# Patient Record
Sex: FEMALE | Race: WHITE | HISPANIC OR LATINO | URBAN - METROPOLITAN AREA
[De-identification: names, ages, dates, MRNs, and addresses within clinical notes are randomized per-mention and may not be internally consistent; named-entity substitution may affect disease eponyms.]

---

## 2021-01-16 ENCOUNTER — OFFICE VISIT (OUTPATIENT)
Dept: URBAN - METROPOLITAN AREA TELEHEALTH 2 | Facility: TELEHEALTH | Age: 76
End: 2021-01-16
Payer: MEDICARE

## 2021-01-16 DIAGNOSIS — D50.9 ANEMIA, IRON DEFICIENCY: ICD-10-CM

## 2021-01-16 DIAGNOSIS — K59.09 CHRONIC CONSTIPATION: ICD-10-CM

## 2021-01-16 DIAGNOSIS — D50.8 ANEMIA, DUE TO INADEQUATE IRON INTAKE: ICD-10-CM

## 2021-01-16 PROBLEM — 87522002 IRON DEFICIENCY ANEMIA: Status: ACTIVE | Noted: 2021-01-16

## 2021-01-16 PROCEDURE — G9904 DOC MED RSN NO TBCO SCRN: HCPCS | Performed by: INTERNAL MEDICINE

## 2021-01-16 PROCEDURE — 99203 OFFICE O/P NEW LOW 30 MIN: CPT | Performed by: INTERNAL MEDICINE

## 2021-01-16 PROCEDURE — G8427 DOCREV CUR MEDS BY ELIG CLIN: HCPCS | Performed by: INTERNAL MEDICINE

## 2021-01-16 PROCEDURE — G8482 FLU IMMUNIZE ORDER/ADMIN: HCPCS | Performed by: INTERNAL MEDICINE

## 2021-01-16 PROCEDURE — G8422 PT INELIG BMI CALCULATION: HCPCS | Performed by: INTERNAL MEDICINE

## 2021-01-16 PROCEDURE — 1036F TOBACCO NON-USER: CPT | Performed by: INTERNAL MEDICINE

## 2021-01-16 PROCEDURE — G9903 PT SCRN TBCO ID AS NON USER: HCPCS | Performed by: INTERNAL MEDICINE

## 2021-01-16 RX ORDER — FOLIC ACID 1 MG/1
TABLET ORAL
Qty: 0 | Refills: 0 | Status: ACTIVE | COMMUNITY
Start: 2016-09-19

## 2021-01-16 RX ORDER — SODIUM, POTASSIUM,MAG SULFATES 17.5-3.13G
354ML SOLUTION, RECONSTITUTED, ORAL ORAL
Qty: 345 MILLILITER | Refills: 0 | OUTPATIENT
Start: 2021-01-16 | End: 2021-01-17

## 2021-01-16 RX ORDER — ATORVASTATIN CALCIUM 10 MG/1
TABLET, FILM COATED ORAL
Qty: 0 | Refills: 0 | Status: ACTIVE | COMMUNITY
Start: 2016-06-16

## 2021-01-16 NOTE — HPI-TODAY'S VISIT:
74 yo pt referred by Dr. Simone Dowd and Dr. Michael Busby for evaluation of anemia. Patient was Dx'd w/ COVID-19 pneumonia, admitted to [facility] x 4 days, resolved, w/ normal Pulmonary evaluation post-Covid, currently w/o respiratory sxs. patient w/ Hx RA, on Xeljanz / MTX qwk, off these Rx sinve she was Dx'd w/ AYAAN, and no melenic stools, but she reports intermittent rectal bleeding w/ straining at stools. No hematochezia. Denies use of ASA / NSAIDs and no anorexia or weight loss.  She has chronic constipation, controlled w/ Prunelax.  Normal colonoscopy 9 years ago with Dr. Busby. No other complaints.

## 2021-02-05 ENCOUNTER — OFFICE VISIT (OUTPATIENT)
Dept: URBAN - METROPOLITAN AREA LAB 3 | Facility: LAB | Age: 76
End: 2021-02-05
Payer: MEDICARE

## 2021-02-05 DIAGNOSIS — D50.9 ANEMIA, IRON DEFICIENCY: ICD-10-CM

## 2021-02-05 DIAGNOSIS — K25.9 ANTRAL ULCER: ICD-10-CM

## 2021-02-05 DIAGNOSIS — K31.89 ACQUIRED DEFORMITY OF DUODENUM: ICD-10-CM

## 2021-02-05 PROCEDURE — 45378 DIAGNOSTIC COLONOSCOPY: CPT | Performed by: INTERNAL MEDICINE

## 2021-02-05 PROCEDURE — 43239 EGD BIOPSY SINGLE/MULTIPLE: CPT | Performed by: INTERNAL MEDICINE

## 2021-02-05 RX ORDER — ATORVASTATIN CALCIUM 10 MG/1
TABLET, FILM COATED ORAL
Qty: 0 | Refills: 0 | Status: ACTIVE | COMMUNITY
Start: 2016-06-16

## 2021-02-05 RX ORDER — FOLIC ACID 1 MG/1
TABLET ORAL
Qty: 0 | Refills: 0 | Status: ACTIVE | COMMUNITY
Start: 2016-09-19

## 2021-02-08 ENCOUNTER — TELEPHONE ENCOUNTER (OUTPATIENT)
Dept: URBAN - METROPOLITAN AREA CLINIC 98 | Facility: CLINIC | Age: 76
End: 2021-02-08

## 2021-02-08 RX ORDER — PANTOPRAZOLE SODIUM 40 MG/1
1 TABLET TABLET, DELAYED RELEASE ORAL ONCE A DAY
Qty: 30 | Refills: 3 | OUTPATIENT
Start: 2021-02-13

## 2021-02-16 ENCOUNTER — OFFICE VISIT (OUTPATIENT)
Dept: URBAN - METROPOLITAN AREA CLINIC 98 | Facility: CLINIC | Age: 76
End: 2021-02-16

## 2021-02-25 ENCOUNTER — OFFICE VISIT (OUTPATIENT)
Dept: URBAN - METROPOLITAN AREA CLINIC 98 | Facility: CLINIC | Age: 76
End: 2021-02-25

## 2021-03-06 ENCOUNTER — OFFICE VISIT (OUTPATIENT)
Dept: URBAN - METROPOLITAN AREA TELEHEALTH 2 | Facility: TELEHEALTH | Age: 76
End: 2021-03-06
Payer: MEDICARE

## 2021-03-06 DIAGNOSIS — K57.90 DIVERTICULOSIS: ICD-10-CM

## 2021-03-06 DIAGNOSIS — K21.9 GERD WITHOUT ESOPHAGITIS: ICD-10-CM

## 2021-03-06 DIAGNOSIS — E61.1 IRON DEFICIENCY: ICD-10-CM

## 2021-03-06 PROCEDURE — 99214 OFFICE O/P EST MOD 30 MIN: CPT | Performed by: INTERNAL MEDICINE

## 2021-03-06 RX ORDER — PANTOPRAZOLE SODIUM 40 MG/1
1 TABLET TABLET, DELAYED RELEASE ORAL ONCE A DAY
Qty: 30 | Refills: 3 | Status: ACTIVE | COMMUNITY
Start: 2021-02-13

## 2021-03-06 RX ORDER — FOLIC ACID 1 MG/1
TABLET ORAL
Qty: 0 | Refills: 0 | Status: ACTIVE | COMMUNITY
Start: 2016-09-19

## 2021-03-06 RX ORDER — PANTOPRAZOLE SODIUM 40 MG/1
1 TABLET TABLET, DELAYED RELEASE ORAL ONCE A DAY
Qty: 30 | Refills: 3 | OUTPATIENT
Start: 2021-03-06

## 2021-03-06 RX ORDER — ATORVASTATIN CALCIUM 10 MG/1
TABLET, FILM COATED ORAL
Qty: 0 | Refills: 0 | Status: ACTIVE | COMMUNITY
Start: 2016-06-16

## 2021-03-06 NOTE — HPI-TODAY'S VISIT:
76 yo pt w/ AYAAN,RA was on Xeljanz / MTX q wk, none since she was Dx'd w/ COVID-19 pneumonia requiring Hospital admission x 4 days w/ resolution and normal CXR last week w/ normal Pulmonary evaluation. Overall, feeling better. EGD 2/21: NERD, 1/2 size HH and ulcerative Hp-negative reactive gastritis w/ normal duodenal bx's. Colonoscopy 2/21: L-diverticulosis and E Hrrds.  Currently on FesO4. Labs w/ Dr. Dowd 2 wks ago and results are pending. She has been scheduled for Injectafer in a couple of weeks Has no evidence of GI or  bleeding. No anorexia or weight loss. Her ARSLAN sxs are controlled w/ diet and Pantoprazole qd.

## 2021-03-07 PROBLEM — 143441000119108: Status: ACTIVE | Noted: 2021-03-07

## 2021-04-13 ENCOUNTER — OFFICE VISIT (OUTPATIENT)
Dept: URBAN - METROPOLITAN AREA CLINIC 98 | Facility: CLINIC | Age: 76
End: 2021-04-13
Payer: MEDICARE

## 2021-04-13 DIAGNOSIS — K21.9 GERD WITHOUT ESOPHAGITIS: ICD-10-CM

## 2021-04-13 DIAGNOSIS — K57.90 DIVERTICULOSIS: ICD-10-CM

## 2021-04-13 DIAGNOSIS — D50.0 IRON DEFICIENCY ANEMIA DUE TO CHRONIC BLOOD LOSS: ICD-10-CM

## 2021-04-13 DIAGNOSIS — M05.79 RHEUMATOID ARTHRITIS INVOLVING MULTIPLE SITES WITH POSITIVE RHEUMATOID FACTOR: ICD-10-CM

## 2021-04-13 PROCEDURE — 99214 OFFICE O/P EST MOD 30 MIN: CPT | Performed by: INTERNAL MEDICINE

## 2021-04-13 RX ORDER — PANTOPRAZOLE SODIUM 40 MG/1
1 TABLET TABLET, DELAYED RELEASE ORAL ONCE A DAY
Qty: 30 | Refills: 3 | OUTPATIENT
Start: 2021-04-15

## 2021-04-13 RX ORDER — FAMOTIDINE 40 MG/1
1 TABLET AT BEDTIME TABLET, FILM COATED ORAL ONCE A DAY
Qty: 30 | Refills: 3 | OUTPATIENT
Start: 2021-04-15

## 2021-04-13 RX ORDER — PANTOPRAZOLE SODIUM 40 MG/1
1 TABLET TABLET, DELAYED RELEASE ORAL ONCE A DAY
Qty: 30 | Refills: 3 | Status: DISCONTINUED | COMMUNITY
Start: 2021-03-06

## 2021-04-13 RX ORDER — PANTOPRAZOLE SODIUM 40 MG/1
1 TABLET TABLET, DELAYED RELEASE ORAL ONCE A DAY
Qty: 30 | Refills: 3 | Status: ACTIVE | COMMUNITY
Start: 2021-02-13

## 2021-04-13 RX ORDER — ATORVASTATIN CALCIUM 10 MG/1
TABLET, FILM COATED ORAL
Qty: 0 | Refills: 0 | Status: ACTIVE | COMMUNITY
Start: 2016-06-16

## 2021-04-13 RX ORDER — FOLIC ACID 1 MG/1
TABLET ORAL
Qty: 0 | Refills: 0 | Status: ACTIVE | COMMUNITY
Start: 2016-09-19

## 2021-04-13 NOTE — HPI-TODAY'S VISIT:
76 yo pt w/ AYAAN, RA currently on Prednisone 7.5 mg /d, who has been feeling better lately. Some intermittent p-prandial abdominal pain, w/o radiation which is gradually resolving. Her ARSLAN sxs are well controlled, and has just started back on Pantoprazole.  Had CPE w/ Dr. Busby 2 wks ago: increase in Hb on FeSO4 and no need for Injectafer at ths time. EGD 2/21: NERD, 1/2 size HH and ulceratibe Hp-negative gastritis. Colonoscopy 2/21: L-diverticulosis and E-Hrrds. She has no bleeding nor melenic stools and reports no cardiorespiratory sxs.

## 2021-07-13 ENCOUNTER — OFFICE VISIT (OUTPATIENT)
Dept: URBAN - METROPOLITAN AREA CLINIC 98 | Facility: CLINIC | Age: 76
End: 2021-07-13

## 2021-08-05 ENCOUNTER — WEB ENCOUNTER (OUTPATIENT)
Dept: URBAN - METROPOLITAN AREA CLINIC 98 | Facility: CLINIC | Age: 76
End: 2021-08-05

## 2021-08-05 ENCOUNTER — OFFICE VISIT (OUTPATIENT)
Dept: URBAN - METROPOLITAN AREA CLINIC 98 | Facility: CLINIC | Age: 76
End: 2021-08-05
Payer: MEDICARE

## 2021-08-05 DIAGNOSIS — K59.01 SLOW TRANSIT CONSTIPATION: ICD-10-CM

## 2021-08-05 DIAGNOSIS — K31.84 GASTROPARESIS: ICD-10-CM

## 2021-08-05 DIAGNOSIS — K21.9 GERD WITHOUT ESOPHAGITIS: ICD-10-CM

## 2021-08-05 DIAGNOSIS — K57.90 DIVERTICULOSIS: ICD-10-CM

## 2021-08-05 DIAGNOSIS — D50.0 IRON DEFICIENCY ANEMIA DUE TO CHRONIC BLOOD LOSS: ICD-10-CM

## 2021-08-05 DIAGNOSIS — M05.79 RHEUMATOID ARTHRITIS INVOLVING MULTIPLE SITES WITH POSITIVE RHEUMATOID FACTOR: ICD-10-CM

## 2021-08-05 PROCEDURE — 99213 OFFICE O/P EST LOW 20 MIN: CPT | Performed by: INTERNAL MEDICINE

## 2021-08-05 RX ORDER — PANTOPRAZOLE SODIUM 40 MG/1
1 TABLET TABLET, DELAYED RELEASE ORAL ONCE A DAY
Qty: 30 | Refills: 3 | Status: DISCONTINUED | COMMUNITY
Start: 2021-04-15

## 2021-08-05 RX ORDER — ATORVASTATIN CALCIUM 10 MG/1
TABLET, FILM COATED ORAL
Qty: 0 | Refills: 0 | Status: ACTIVE | COMMUNITY
Start: 2016-06-16

## 2021-08-05 RX ORDER — FAMOTIDINE 40 MG/1
1 TABLET AT BEDTIME TABLET, FILM COATED ORAL ONCE A DAY
Qty: 30 | Refills: 3 | Status: ACTIVE | COMMUNITY
Start: 2021-04-15

## 2021-08-05 RX ORDER — PANTOPRAZOLE SODIUM 40 MG/1
1 TABLET TABLET, DELAYED RELEASE ORAL ONCE A DAY
Qty: 30 | Refills: 3 | Status: ACTIVE | COMMUNITY
Start: 2021-02-13

## 2021-08-05 RX ORDER — FOLIC ACID 1 MG/1
TABLET ORAL
Qty: 0 | Refills: 0 | Status: ACTIVE | COMMUNITY
Start: 2016-09-19

## 2021-08-05 RX ORDER — TOFACITINIB 11 MG/1
1 TABLET TABLET, FILM COATED, EXTENDED RELEASE ORAL ONCE A DAY
Status: ACTIVE | COMMUNITY

## 2021-08-05 NOTE — HPI-TODAY'S VISIT:
74 yo pt w/ AYAAN, RA currently on Prednisone 2.5 mg /qod, who has been feeling better lately. Some intermittent p-prandial abdominal pain, distention, w/o radiation which is gradually resolving. Her ARSLAN sxs are well controlled, and has just started back on Pantoprazole.  Had recent CPE w/ Dr. Busby: increase in Hb on FeSO4 and no need for Injectafer at ths time. EGD 2/21: NERD, 1/2 size HH and ulcerative Hp-negative gastritis. Colonoscopy 2/21: L-diverticulosis and E-Hrrds. She has no bleeding nor melenic stools and reports no cardiorespiratory sxs.

## 2021-08-10 ENCOUNTER — TELEPHONE ENCOUNTER (OUTPATIENT)
Dept: URBAN - METROPOLITAN AREA CLINIC 23 | Facility: CLINIC | Age: 76
End: 2021-08-10

## 2021-10-12 ENCOUNTER — OFFICE VISIT (OUTPATIENT)
Dept: URBAN - METROPOLITAN AREA CLINIC 98 | Facility: CLINIC | Age: 76
End: 2021-10-12

## 2021-11-18 ENCOUNTER — OFFICE VISIT (OUTPATIENT)
Dept: URBAN - METROPOLITAN AREA CLINIC 98 | Facility: CLINIC | Age: 76
End: 2021-11-18
Payer: MEDICARE

## 2021-11-18 DIAGNOSIS — K21.9 GERD WITHOUT ESOPHAGITIS: ICD-10-CM

## 2021-11-18 DIAGNOSIS — K57.90 DIVERTICULOSIS: ICD-10-CM

## 2021-11-18 DIAGNOSIS — M05.79 RHEUMATOID ARTHRITIS INVOLVING MULTIPLE SITES WITH POSITIVE RHEUMATOID FACTOR: ICD-10-CM

## 2021-11-18 DIAGNOSIS — K31.84 GASTROPARESIS: ICD-10-CM

## 2021-11-18 DIAGNOSIS — K59.01 SLOW TRANSIT CONSTIPATION: ICD-10-CM

## 2021-11-18 DIAGNOSIS — D50.0 IRON DEFICIENCY ANEMIA DUE TO CHRONIC BLOOD LOSS: ICD-10-CM

## 2021-11-18 PROCEDURE — 99214 OFFICE O/P EST MOD 30 MIN: CPT | Performed by: INTERNAL MEDICINE

## 2021-11-18 RX ORDER — FOLIC ACID 1 MG/1
TABLET ORAL
Qty: 0 | Refills: 0 | Status: ACTIVE | COMMUNITY
Start: 2016-09-19

## 2021-11-18 RX ORDER — TOFACITINIB 11 MG/1
1 TABLET TABLET, FILM COATED, EXTENDED RELEASE ORAL ONCE A DAY
Status: ACTIVE | COMMUNITY

## 2021-11-18 RX ORDER — PANTOPRAZOLE SODIUM 40 MG/1
1 TABLET TABLET, DELAYED RELEASE ORAL ONCE A DAY
Qty: 30 | Refills: 3 | Status: ACTIVE | COMMUNITY
Start: 2021-02-13

## 2021-11-18 RX ORDER — ATORVASTATIN CALCIUM 10 MG/1
TABLET, FILM COATED ORAL
Qty: 0 | Refills: 0 | Status: ACTIVE | COMMUNITY
Start: 2016-06-16

## 2021-11-18 RX ORDER — FAMOTIDINE 40 MG/1
1 TABLET AT BEDTIME TABLET, FILM COATED ORAL ONCE A DAY
Qty: 30 | Refills: 3 | Status: ACTIVE | COMMUNITY
Start: 2021-04-15

## 2021-11-18 NOTE — HPI-TODAY'S VISIT:
76 yo pt w/ AYAAN, seropositive RA currently on Prednisone 2.5 mg /qod ( failed Humira, was on Xeljanz / MTX  )who has been feeling better lately, w less epigastric discomfort and no N/V nor lalito ARSLAN sxs. Some intermittent p-prandial epigastric discomfort w/ distention, w/o radiation which is gradually improving w/ diet changes.  Her ARSLAN sxs are well controlled on Pantoprazole.  Had recent CPE w/ Dr. Busby: increase in Hb on FeSO4 and no need for Injectafer at ths time. EGD 2/21: NERD, 1/2 size HH and ulcerative Hp-negative gastritis. Colonoscopy 2/21: L-diverticulosis and E-Hrrds. She has no bleeding nor melenic stools and reports no cardiorespiratory sxs. GES not done as yet. Had COVID-19 12/20 and has received 2 doses of COVID-19 vaccine.

## 2022-01-14 ENCOUNTER — ERX REFILL RESPONSE (OUTPATIENT)
Dept: URBAN - METROPOLITAN AREA CLINIC 98 | Facility: CLINIC | Age: 77
End: 2022-01-14

## 2022-01-14 RX ORDER — FAMOTIDINE 40 MG/1
TAKE 1 TABLET ONE TIME DAILY AT BEDTIME TABLET, FILM COATED ORAL
Qty: 90 TABLET | Refills: 0 | OUTPATIENT

## 2022-01-14 RX ORDER — FAMOTIDINE 40 MG/1
TAKE 1 TABLET ONE TIME DAILY AT BEDTIME TABLET, FILM COATED ORAL
Qty: 90 TABLET | Refills: 1 | OUTPATIENT

## 2022-01-14 RX ORDER — PANTOPRAZOLE SODIUM 40 MG/1
TAKE 1 TABLET ONE TIME DAILY TABLET, DELAYED RELEASE ORAL
Qty: 90 TABLET | Refills: 0 | OUTPATIENT

## 2022-01-14 RX ORDER — PANTOPRAZOLE SODIUM 40 MG/1
TAKE 1 TABLET ONE TIME DAILY TABLET, DELAYED RELEASE ORAL
Qty: 90 TABLET | Refills: 1 | OUTPATIENT

## 2022-03-30 ENCOUNTER — ERX REFILL RESPONSE (OUTPATIENT)
Dept: URBAN - METROPOLITAN AREA CLINIC 98 | Facility: CLINIC | Age: 77
End: 2022-03-30

## 2022-03-30 RX ORDER — FAMOTIDINE 40 MG/1
TAKE 1 TABLET ONE TIME DAILY AT BEDTIME TABLET, FILM COATED ORAL
Qty: 90 TABLET | Refills: 1 | OUTPATIENT

## 2022-03-30 RX ORDER — PANTOPRAZOLE SODIUM 40 MG/1
TAKE 1 TABLET ONE TIME DAILY TABLET, DELAYED RELEASE ORAL
Qty: 90 TABLET | Refills: 1 | OUTPATIENT

## 2022-06-08 ENCOUNTER — OFFICE VISIT (OUTPATIENT)
Dept: URBAN - METROPOLITAN AREA CLINIC 98 | Facility: CLINIC | Age: 77
End: 2022-06-08
Payer: MEDICARE

## 2022-06-08 VITALS
DIASTOLIC BLOOD PRESSURE: 64 MMHG | TEMPERATURE: 97.1 F | HEIGHT: 63 IN | BODY MASS INDEX: 25.94 KG/M2 | SYSTOLIC BLOOD PRESSURE: 116 MMHG | WEIGHT: 146.4 LBS | HEART RATE: 68 BPM

## 2022-06-08 DIAGNOSIS — K57.90 DIVERTICULOSIS: ICD-10-CM

## 2022-06-08 DIAGNOSIS — D50.0 IRON DEFICIENCY ANEMIA DUE TO CHRONIC BLOOD LOSS: ICD-10-CM

## 2022-06-08 DIAGNOSIS — K31.84 GASTROPARESIS: ICD-10-CM

## 2022-06-08 DIAGNOSIS — K21.9 GERD WITHOUT ESOPHAGITIS: ICD-10-CM

## 2022-06-08 DIAGNOSIS — M05.79 RHEUMATOID ARTHRITIS INVOLVING MULTIPLE SITES WITH POSITIVE RHEUMATOID FACTOR: ICD-10-CM

## 2022-06-08 DIAGNOSIS — K59.01 SLOW TRANSIT CONSTIPATION: ICD-10-CM

## 2022-06-08 PROCEDURE — 99214 OFFICE O/P EST MOD 30 MIN: CPT | Performed by: INTERNAL MEDICINE

## 2022-06-08 RX ORDER — PANTOPRAZOLE SODIUM 40 MG/1
TAKE 1 TABLET ONE TIME DAILY TABLET, DELAYED RELEASE ORAL
Qty: 90 TABLET | Refills: 1 | Status: ACTIVE | COMMUNITY

## 2022-06-08 RX ORDER — PRUCALOPRIDE 1 MG/1
1 TABLET TABLET, FILM COATED ORAL ONCE A DAY
Qty: 30 | Refills: 3 | OUTPATIENT
Start: 2022-06-08 | End: 2022-10-06

## 2022-06-08 RX ORDER — APIXABAN 2.5 MG/1
AS DIRECTED TABLET, FILM COATED ORAL
Status: ACTIVE | COMMUNITY

## 2022-06-08 RX ORDER — PRUCALOPRIDE 1 MG/1
1 TABLET TABLET, FILM COATED ORAL ONCE A DAY
Qty: 30 | Refills: 3 | OUTPATIENT
Start: 2022-06-13 | End: 2022-10-11

## 2022-06-08 RX ORDER — ATORVASTATIN CALCIUM 10 MG/1
TABLET, FILM COATED ORAL
Qty: 0 | Refills: 0 | Status: ACTIVE | COMMUNITY
Start: 2016-06-16

## 2022-06-08 RX ORDER — FAMOTIDINE 40 MG/1
TAKE 1 TABLET ONE TIME DAILY AT BEDTIME TABLET, FILM COATED ORAL
Qty: 90 TABLET | Refills: 1 | Status: ACTIVE | COMMUNITY

## 2022-06-08 RX ORDER — FOLIC ACID 1 MG/1
TABLET ORAL
Qty: 0 | Refills: 0 | Status: ACTIVE | COMMUNITY
Start: 2016-09-19

## 2022-06-08 RX ORDER — TOFACITINIB 11 MG/1
1 TABLET TABLET, FILM COATED, EXTENDED RELEASE ORAL ONCE A DAY
Status: ACTIVE | COMMUNITY

## 2022-06-08 NOTE — HPI-TODAY'S VISIT:
75 yo pt w/ AYAAN, seropositive RA currently on Prednisone 2.5 mg /qod ( failed Humira, was on Xeljanz / MTX  ), she  will be starting Rinvoq ( Upadacitinib ) for RA., who has been feeling better lately, w less epigastric discomfort and no N/V nor lalito ARSLAN sxs. Some intermittent p-prandial epigastric discomfort w/ distention, w/o radiation which is gradually improving w/ diet changes.  Her ARSLAN sxs are well controlled on Pantoprazole.  Had recent CPE w/ Dr. Busby: increase in Hb on FeSO4 and no need for Injectafer at ths time. EGD 2/21: NERD, 1/2 size HH and ulcerative Hp-negative gastritis. Colonoscopy 2/21: L-diverticulosis and E-Hrrds.  GES 12/21: severe gastroparesis. Was experiencing palpitations / SINGLETON; seen by Cardiology, Dr. Elder Barrera: Dxs w A. fib, on Sotalol / Eliquis, and has appointmrent w Dr Izabela Singh  for Echo tomorrow.  Seen  by Dr. Busby recently: Labs and A + P CT scan pending. Recent s/p variociose veins treatment.  She has no bleeding nor melenic stools and reports no cardiorespiratory sxs. Had COVID-19 12/20 and has received 2 doses of COVID-19 vaccine.

## 2022-08-10 ENCOUNTER — OFFICE VISIT (OUTPATIENT)
Dept: URBAN - METROPOLITAN AREA CLINIC 98 | Facility: CLINIC | Age: 77
End: 2022-08-10
Payer: MEDICARE

## 2022-08-10 VITALS
DIASTOLIC BLOOD PRESSURE: 71 MMHG | WEIGHT: 144.2 LBS | HEART RATE: 70 BPM | SYSTOLIC BLOOD PRESSURE: 122 MMHG | BODY MASS INDEX: 25.55 KG/M2 | TEMPERATURE: 97.2 F | HEIGHT: 63 IN

## 2022-08-10 DIAGNOSIS — M05.79 RHEUMATOID ARTHRITIS INVOLVING MULTIPLE SITES WITH POSITIVE RHEUMATOID FACTOR: ICD-10-CM

## 2022-08-10 DIAGNOSIS — R30.0 DYSURIA: ICD-10-CM

## 2022-08-10 DIAGNOSIS — K57.90 DIVERTICULOSIS: ICD-10-CM

## 2022-08-10 DIAGNOSIS — K59.01 SLOW TRANSIT CONSTIPATION: ICD-10-CM

## 2022-08-10 DIAGNOSIS — D50.0 IRON DEFICIENCY ANEMIA DUE TO CHRONIC BLOOD LOSS: ICD-10-CM

## 2022-08-10 DIAGNOSIS — K21.9 GERD WITHOUT ESOPHAGITIS: ICD-10-CM

## 2022-08-10 DIAGNOSIS — K31.84 GASTROPARESIS: ICD-10-CM

## 2022-08-10 PROCEDURE — 99214 OFFICE O/P EST MOD 30 MIN: CPT | Performed by: INTERNAL MEDICINE

## 2022-08-10 RX ORDER — TOFACITINIB 11 MG/1
1 TABLET TABLET, FILM COATED, EXTENDED RELEASE ORAL ONCE A DAY
Status: ACTIVE | COMMUNITY

## 2022-08-10 RX ORDER — PRUCALOPRIDE 1 MG/1
1 TABLET TABLET, FILM COATED ORAL ONCE A DAY
Qty: 30 | Refills: 3 | OUTPATIENT

## 2022-08-10 RX ORDER — PRUCALOPRIDE 1 MG/1
1 TABLET TABLET, FILM COATED ORAL ONCE A DAY
Qty: 30 | Refills: 3 | Status: ACTIVE | COMMUNITY
Start: 2022-06-08 | End: 2022-10-06

## 2022-08-10 RX ORDER — PRUCALOPRIDE 1 MG/1
1 TABLET TABLET, FILM COATED ORAL ONCE A DAY
Qty: 30 | Refills: 3 | Status: ACTIVE | COMMUNITY
Start: 2022-06-13 | End: 2022-10-11

## 2022-08-10 RX ORDER — ATORVASTATIN CALCIUM 10 MG/1
TABLET, FILM COATED ORAL
Qty: 0 | Refills: 0 | Status: ACTIVE | COMMUNITY
Start: 2016-06-16

## 2022-08-10 RX ORDER — FOLIC ACID 1 MG/1
TABLET ORAL
Qty: 0 | Refills: 0 | Status: ACTIVE | COMMUNITY
Start: 2016-09-19

## 2022-08-10 RX ORDER — PHENAZOPYRIDINE 100 MG/1
1 TABLET AFTER MEALS TABLET, FILM COATED ORAL THREE TIMES A DAY
Qty: 6 TABLET | Refills: 0 | OUTPATIENT
Start: 2022-08-10 | End: 2022-08-12

## 2022-08-10 RX ORDER — APIXABAN 2.5 MG/1
AS DIRECTED TABLET, FILM COATED ORAL
Status: ACTIVE | COMMUNITY

## 2022-08-10 RX ORDER — PANTOPRAZOLE SODIUM 40 MG/1
TAKE 1 TABLET ONE TIME DAILY TABLET, DELAYED RELEASE ORAL
Qty: 90 TABLET | Refills: 1 | Status: ACTIVE | COMMUNITY

## 2022-08-10 RX ORDER — FAMOTIDINE 40 MG/1
TAKE 1 TABLET ONE TIME DAILY AT BEDTIME TABLET, FILM COATED ORAL
Qty: 90 TABLET | Refills: 1 | Status: ACTIVE | COMMUNITY

## 2022-08-10 NOTE — HPI-TODAY'S VISIT:
77 yo pt w/ AYAAN, seropositive RA currently on Prednisone 2.5 mg /qod ( failed Humira, was on Xeljanz / MTX  ), she  will be starting Rinvoq ( Upadacitinib ) for RA., who has been feeling better lately, w less epigastric discomfort and no N/V nor lalito ARSLAN sxs on Pantoprazole. Famotidine caused bloating; she tried Cometidine OTC w good results.. Some  p-prandial epigastric discomfort w/ distention,  gradually improving w/ diet changes.  Her ARSLAN sxs are well controlled on Pantoprazole.  Had recent CPE w/ Dr. Busby: increase in Hb on FeSO4 and no need for Injectafer at ths time. Recent CT scan w Dr Busby: umbilicla hernia and scheduled for surgery.  EGD 2/21: NERD, 1/2 size HH and ulcerative Hp-negative gastritis. Colonoscopy 2/21: L-diverticulosis and E-Hrrds.  GES 12/21: severe gastroparesis. Was experiencing palpitations / SINGLETON; seen by Cardiology, Dr. Elder Barrera: Dxs w A. fib, on Sotalol / Eliquis, and has appointmrent w Dr Izabela Singh  for further evaluation. Recent s/p variociose veins treatment.  She reports dysuria for few days wo hematuria. She has no bleeding nor melenic stools and reports no cardiorespiratory sxs. Had COVID-19 12/20 and has received 2 doses of COVID-19 vaccine.

## 2022-08-15 PROBLEM — 266435005: Status: ACTIVE | Noted: 2021-03-06

## 2022-08-15 PROBLEM — 287006005: Status: ACTIVE | Noted: 2021-04-15

## 2022-08-15 PROBLEM — 235675006: Status: ACTIVE | Noted: 2021-08-05

## 2022-08-15 PROBLEM — 397881000: Status: ACTIVE | Noted: 2021-03-07

## 2022-08-15 PROBLEM — 724556004: Status: ACTIVE | Noted: 2021-04-15

## 2022-08-15 PROBLEM — 35298007: Status: ACTIVE | Noted: 2021-08-09

## 2022-08-20 LAB
APPEARANCE: CLEAR
BACTERIA: (no result)
BILIRUBIN: NEGATIVE
CAST TYPE: (no result)
CASTS: (no result)
COMMENT: (no result)
CRYSTAL TYPE: (no result)
CRYSTALS: (no result)
EPITHELIAL CELLS (NON RENAL): (no result)
EPITHELIAL CELLS (RENAL): (no result)
GLUCOSE: NEGATIVE
KETONES: NEGATIVE
MICROSCOPIC EXAMINATION: (no result)
MICROSCOPIC EXAMINATION: (no result)
MUCUS THREADS: (no result)
NITRITE, URINE: NEGATIVE
OCCULT BLOOD: NEGATIVE
PH: 7.5
PROTEIN: NEGATIVE
RBC: (no result)
SPECIFIC GRAVITY: 1.01
TRICHOMONAS: (no result)
URINALYSIS REFLEX: (no result)
URINE-COLOR: YELLOW
UROBILINOGEN,SEMI-QN: 1
WBC ESTERASE: NEGATIVE
WBC: (no result)
YEAST: (no result)

## 2022-09-14 ENCOUNTER — ERX REFILL RESPONSE (OUTPATIENT)
Dept: URBAN - METROPOLITAN AREA CLINIC 98 | Facility: CLINIC | Age: 77
End: 2022-09-14

## 2022-09-14 RX ORDER — FAMOTIDINE 40 MG/1
TAKE 1 TABLET AT BEDTIME TABLET, FILM COATED ORAL
Qty: 90 TABLET | Refills: 0 | OUTPATIENT

## 2022-09-14 RX ORDER — FAMOTIDINE 40 MG/1
TAKE 1 TABLET ONE TIME DAILY AT BEDTIME TABLET, FILM COATED ORAL
Qty: 90 TABLET | Refills: 1 | OUTPATIENT

## 2022-09-22 ENCOUNTER — OFFICE VISIT (OUTPATIENT)
Dept: URBAN - METROPOLITAN AREA CLINIC 98 | Facility: CLINIC | Age: 77
End: 2022-09-22

## 2023-04-27 ENCOUNTER — TELEPHONE ENCOUNTER (OUTPATIENT)
Dept: URBAN - METROPOLITAN AREA CLINIC 98 | Facility: CLINIC | Age: 78
End: 2023-04-27

## 2023-09-05 ENCOUNTER — OFFICE VISIT (OUTPATIENT)
Dept: URBAN - METROPOLITAN AREA CLINIC 78 | Facility: CLINIC | Age: 78
End: 2023-09-05
Payer: MEDICARE

## 2023-09-05 VITALS
DIASTOLIC BLOOD PRESSURE: 66 MMHG | HEART RATE: 68 BPM | BODY MASS INDEX: 25.52 KG/M2 | TEMPERATURE: 98.2 F | RESPIRATION RATE: 15 BRPM | WEIGHT: 144 LBS | HEIGHT: 63 IN | SYSTOLIC BLOOD PRESSURE: 111 MMHG

## 2023-09-05 DIAGNOSIS — K21.9 GERD WITHOUT ESOPHAGITIS: ICD-10-CM

## 2023-09-05 DIAGNOSIS — K57.90 DIVERTICULOSIS: ICD-10-CM

## 2023-09-05 DIAGNOSIS — K31.84 GASTROPARESIS: ICD-10-CM

## 2023-09-05 DIAGNOSIS — D50.0 IRON DEFICIENCY ANEMIA DUE TO CHRONIC BLOOD LOSS: ICD-10-CM

## 2023-09-05 PROBLEM — 782415009: Status: ACTIVE | Noted: 2023-09-05

## 2023-09-05 PROCEDURE — 99214 OFFICE O/P EST MOD 30 MIN: CPT | Performed by: INTERNAL MEDICINE

## 2023-09-05 RX ORDER — PRUCALOPRIDE 1 MG/1
1 TABLET TABLET, FILM COATED ORAL ONCE A DAY
Qty: 30 | Refills: 3 | Status: ON HOLD | COMMUNITY

## 2023-09-05 RX ORDER — FAMOTIDINE 40 MG/1
TAKE 1 TABLET AT BEDTIME TABLET, FILM COATED ORAL
Qty: 90 TABLET | Refills: 0 | Status: ACTIVE | COMMUNITY

## 2023-09-05 RX ORDER — APIXABAN 2.5 MG/1
AS DIRECTED TABLET, FILM COATED ORAL
Status: ACTIVE | COMMUNITY

## 2023-09-05 RX ORDER — ATORVASTATIN CALCIUM 10 MG/1
TABLET, FILM COATED ORAL
Qty: 0 | Refills: 0 | Status: ACTIVE | COMMUNITY
Start: 2016-06-16

## 2023-09-05 RX ORDER — TOFACITINIB 11 MG/1
1 TABLET TABLET, FILM COATED, EXTENDED RELEASE ORAL ONCE A DAY
Status: ACTIVE | COMMUNITY

## 2023-09-05 RX ORDER — PANTOPRAZOLE SODIUM 40 MG/1
TAKE 1 TABLET ONE TIME DAILY TABLET, DELAYED RELEASE ORAL
Qty: 90 TABLET | Refills: 1 | Status: ON HOLD | COMMUNITY

## 2023-09-05 RX ORDER — FOLIC ACID 1 MG/1
TABLET ORAL
Qty: 0 | Refills: 0 | Status: ACTIVE | COMMUNITY
Start: 2016-09-19

## 2023-09-05 NOTE — HPI-TODAY'S VISIT:
The patient was referred to us by  Dr. Elaine Young for early satiety. A copy of this note will be sent to the referring physician.  She has been seen in the past by LG.  She was diagnsoed with gastroparesis. She complians of epigastric discomfort.  She has been avoiding big meals which has helped her. She has not had any issues with nausea or vomiitng. No epigastric fullness. Her weight has remained stable.   She has ARSLAN sxs relatively well controlled on Pantoprazole. Famotidine caused bloating; she tried Cimetidine OTC in the past w good results.  She has chronic cosntipation. She can easily go about ~1 week without having a BM. She has been on Metamucil and Miralax in the past. She did not feel that these helped. Micheal Peña started her on Amitiza 8mcg QD (BID dosing caused her to have diarrhea). She admits that she has not been taking it on a regular basis.  The patient reports that she was diagnosed with H pylori infection in the summer of 2015. She was treated with an antibiotic regimen for 2 weeks but never followed through to have further testing to confirm eradication of the infection.   She has lactose intolerance. She will use Lactaid as needed. Even when she drinks lactose free milk, she gets very gassy.  She is on Sotalol and Xarelto.   She has a history of AYAAN and seropositive RA on Prednisone 2.5 mg /qod ( failed Humira, Xeljanz / MTX  ), she  was started on Rinvoq ( Upadacitinib ) for RA. She had been on methotrexate but decided to stop it herlsef as she was on too many medications.   She had COVID-19 12/2020 and was hospitalized for 6 days.  She is Garth Zuniga's .  Summary of prior workup: - E/C by LG on 2/5/21 normal esophagus, medium size hiatal hernia.  2 nonbleeding cratered gastric ulcers in the antrum.  Diffuse moderate edema, erosions and erythema throughout the antrum.  Normal duodenum.  Biopsies were negative for H. pylori or celiac sprue. On colonoscopy, there was descending and sigmoid diverticulosis as well as nonbleeding external hemorrhoids.  The quality of the prep was good.   - CT scan w Dr Busby: umbilicla hernia.   - GES 12/21: severe gastroparesis. - EGD and colonoscopy by Dr. Michael Hickman in 2015. As per the patient, these were both normal

## 2023-09-12 LAB
HEMATOCRIT: 35.1
HEMOGLOBIN: 11.4
MCH: 31.4
MCHC: 32.5
MCV: 97
PLATELETS: 423
RBC: 3.63
RDW: 12.5
WBC: 6.5

## 2023-12-04 ENCOUNTER — OFFICE VISIT (OUTPATIENT)
Dept: URBAN - METROPOLITAN AREA CLINIC 78 | Facility: CLINIC | Age: 78
End: 2023-12-04

## 2023-12-04 RX ORDER — FOLIC ACID 1 MG/1
TABLET ORAL
Qty: 0 | Refills: 0 | Status: ACTIVE | COMMUNITY
Start: 2016-09-19

## 2023-12-04 RX ORDER — ATORVASTATIN CALCIUM 10 MG/1
TABLET, FILM COATED ORAL
Qty: 0 | Refills: 0 | Status: ACTIVE | COMMUNITY
Start: 2016-06-16

## 2023-12-04 RX ORDER — TOFACITINIB 11 MG/1
1 TABLET TABLET, FILM COATED, EXTENDED RELEASE ORAL ONCE A DAY
Status: ACTIVE | COMMUNITY

## 2023-12-04 RX ORDER — PANTOPRAZOLE SODIUM 40 MG/1
TAKE 1 TABLET ONE TIME DAILY TABLET, DELAYED RELEASE ORAL
Qty: 90 TABLET | Refills: 1 | Status: ON HOLD | COMMUNITY

## 2023-12-04 RX ORDER — FAMOTIDINE 40 MG/1
TAKE 1 TABLET AT BEDTIME TABLET, FILM COATED ORAL
Qty: 90 TABLET | Refills: 0 | Status: ACTIVE | COMMUNITY

## 2023-12-04 RX ORDER — PRUCALOPRIDE 1 MG/1
1 TABLET TABLET, FILM COATED ORAL ONCE A DAY
Qty: 30 | Refills: 3 | Status: ON HOLD | COMMUNITY

## 2023-12-04 RX ORDER — APIXABAN 2.5 MG/1
AS DIRECTED TABLET, FILM COATED ORAL
Status: ACTIVE | COMMUNITY

## 2024-04-29 ENCOUNTER — OV EP (OUTPATIENT)
Dept: URBAN - METROPOLITAN AREA CLINIC 78 | Facility: CLINIC | Age: 79
End: 2024-04-29
Payer: MEDICARE

## 2024-04-29 VITALS
WEIGHT: 146 LBS | SYSTOLIC BLOOD PRESSURE: 97 MMHG | HEART RATE: 70 BPM | HEIGHT: 63 IN | BODY MASS INDEX: 25.87 KG/M2 | DIASTOLIC BLOOD PRESSURE: 60 MMHG | TEMPERATURE: 98 F

## 2024-04-29 DIAGNOSIS — K21.9 GERD WITHOUT ESOPHAGITIS: ICD-10-CM

## 2024-04-29 DIAGNOSIS — K31.84 GASTROPARESIS: ICD-10-CM

## 2024-04-29 DIAGNOSIS — K57.90 DIVERTICULOSIS: ICD-10-CM

## 2024-04-29 DIAGNOSIS — E73.9 LACTOSE INTOLERANCE: ICD-10-CM

## 2024-04-29 DIAGNOSIS — D50.0 IRON DEFICIENCY ANEMIA DUE TO CHRONIC BLOOD LOSS: ICD-10-CM

## 2024-04-29 DIAGNOSIS — M05.79 RHEUMATOID ARTHRITIS INVOLVING MULTIPLE SITES WITH POSITIVE RHEUMATOID FACTOR: ICD-10-CM

## 2024-04-29 DIAGNOSIS — K59.01 SLOW TRANSIT CONSTIPATION: ICD-10-CM

## 2024-04-29 PROCEDURE — 99214 OFFICE O/P EST MOD 30 MIN: CPT | Performed by: INTERNAL MEDICINE

## 2024-04-29 RX ORDER — APIXABAN 2.5 MG/1
AS DIRECTED TABLET, FILM COATED ORAL
Status: ON HOLD | COMMUNITY

## 2024-04-29 RX ORDER — PRUCALOPRIDE 1 MG/1
1 TABLET TABLET, FILM COATED ORAL ONCE A DAY
Qty: 30 | Refills: 3 | Status: ON HOLD | COMMUNITY

## 2024-04-29 RX ORDER — RIVAROXABAN 15 MG/1
1 TABLET WITH FOOD TABLET, FILM COATED ORAL ONCE A DAY
Status: ACTIVE | COMMUNITY

## 2024-04-29 RX ORDER — UPADACITINIB 15 MG/1
1 TABLET TABLET, EXTENDED RELEASE ORAL ONCE A DAY
Status: ACTIVE | COMMUNITY

## 2024-04-29 RX ORDER — FOLIC ACID 1 MG/1
TABLET ORAL
Qty: 0 | Refills: 0 | Status: ACTIVE | COMMUNITY
Start: 2016-09-19

## 2024-04-29 RX ORDER — FAMOTIDINE 40 MG/1
TAKE 1 TABLET AT BEDTIME TABLET, FILM COATED ORAL
Qty: 90 TABLET | Refills: 0 | Status: ON HOLD | COMMUNITY

## 2024-04-29 RX ORDER — PANTOPRAZOLE SODIUM 40 MG/1
TAKE 1 TABLET ONE TIME DAILY TABLET, DELAYED RELEASE ORAL
Qty: 90 TABLET | Refills: 1 | Status: ON HOLD | COMMUNITY

## 2024-04-29 RX ORDER — ATORVASTATIN CALCIUM 10 MG/1
TABLET, FILM COATED ORAL
Qty: 0 | Refills: 0 | Status: ACTIVE | COMMUNITY
Start: 2016-06-16

## 2024-04-29 RX ORDER — TOFACITINIB 11 MG/1
1 TABLET TABLET, FILM COATED, EXTENDED RELEASE ORAL ONCE A DAY
Status: ON HOLD | COMMUNITY
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
## 2024-06-13 ENCOUNTER — OFFICE VISIT (OUTPATIENT)
Dept: URBAN - METROPOLITAN AREA CLINIC 78 | Facility: CLINIC | Age: 79
End: 2024-06-13

## 2024-08-21 ENCOUNTER — DASHBOARD ENCOUNTERS (OUTPATIENT)
Age: 79
End: 2024-08-21

## 2024-08-22 ENCOUNTER — OFFICE VISIT (OUTPATIENT)
Dept: URBAN - METROPOLITAN AREA CLINIC 78 | Facility: CLINIC | Age: 79
End: 2024-08-22
Payer: MEDICARE

## 2024-08-22 VITALS
DIASTOLIC BLOOD PRESSURE: 74 MMHG | HEIGHT: 63 IN | BODY MASS INDEX: 25.69 KG/M2 | WEIGHT: 145 LBS | SYSTOLIC BLOOD PRESSURE: 115 MMHG | TEMPERATURE: 98 F | HEART RATE: 74 BPM

## 2024-08-22 DIAGNOSIS — D50.0 IRON DEFICIENCY ANEMIA DUE TO CHRONIC BLOOD LOSS: ICD-10-CM

## 2024-08-22 DIAGNOSIS — K31.84 GASTROPARESIS: ICD-10-CM

## 2024-08-22 DIAGNOSIS — K21.9 GERD WITHOUT ESOPHAGITIS: ICD-10-CM

## 2024-08-22 DIAGNOSIS — K57.30 ACQUIRED DIVERTICULOSIS OF COLON: ICD-10-CM

## 2024-08-22 PROCEDURE — 99214 OFFICE O/P EST MOD 30 MIN: CPT | Performed by: INTERNAL MEDICINE

## 2024-08-22 RX ORDER — APIXABAN 2.5 MG/1
AS DIRECTED TABLET, FILM COATED ORAL
Status: ON HOLD | COMMUNITY

## 2024-08-22 RX ORDER — ATORVASTATIN CALCIUM 10 MG/1
TABLET, FILM COATED ORAL
Qty: 0 | Refills: 0 | Status: ACTIVE | COMMUNITY
Start: 2016-06-16

## 2024-08-22 RX ORDER — RIVAROXABAN 15 MG/1
1 TABLET WITH FOOD TABLET, FILM COATED ORAL ONCE A DAY
Status: ACTIVE | COMMUNITY

## 2024-08-22 RX ORDER — PRUCALOPRIDE 1 MG/1
1 TABLET TABLET, FILM COATED ORAL ONCE A DAY
Qty: 30 | Refills: 3 | Status: ON HOLD | COMMUNITY

## 2024-08-22 RX ORDER — FOLIC ACID 1 MG/1
TABLET ORAL
Qty: 0 | Refills: 0 | Status: ACTIVE | COMMUNITY
Start: 2016-09-19

## 2024-08-22 RX ORDER — FAMOTIDINE 40 MG/1
TAKE 1 TABLET AT BEDTIME TABLET, FILM COATED ORAL
Qty: 90 TABLET | Refills: 0 | Status: ON HOLD | COMMUNITY

## 2024-08-22 RX ORDER — PANTOPRAZOLE SODIUM 40 MG/1
TAKE 1 TABLET ONE TIME DAILY TABLET, DELAYED RELEASE ORAL
Qty: 90 TABLET | Refills: 1 | Status: ON HOLD | COMMUNITY

## 2024-08-22 RX ORDER — UPADACITINIB 15 MG/1
1 TABLET TABLET, EXTENDED RELEASE ORAL ONCE A DAY
Status: ACTIVE | COMMUNITY

## 2024-08-22 RX ORDER — TOFACITINIB 11 MG/1
1 TABLET TABLET, FILM COATED, EXTENDED RELEASE ORAL ONCE A DAY
Status: ON HOLD | COMMUNITY

## 2024-08-22 NOTE — HPI-TODAY'S VISIT:
The patient was referred to us by  Dr. Elaine Young for early satiety. A copy of this note will be sent to the referring physician.  She was diagnosed with gastroparesis. She has not had much symptoms related to this except that she is feeling full quickly.   She has been eating a lot more vegetables and fruits.   She is now having a BM daily and has  sense of complete evacuation.  Previously she could easily go about ~1 week without having a BM. She has been on Metamucil and Miralax in the past. She did not feel that these helped. Micheal Peña started her on Amitiza 8mcg QD (BID dosing caused her to have diarrhea).  She has not had any issues with nausea or vomiitng. Her weight has remained stable.   She has ARSLAN sxs relatively well controlled on Pantoprazole. Famotidine caused bloating; she tried Cimetidine OTC in the past w good results. Lately she has not had to use any PPIs or H2 blockers.  The patient reports that she was diagnosed with H pylori infection in the summer of 2015. She was treated with an antibiotic regimen for 2 weeks. Eradication has since been confirmed.   She has lactose intolerance. She will use Lactaid as needed. Even when she drinks lactose free milk, she gets very gassy.  She is on Sotalol and Xarelto.   She has a history of AYAAN and seropositive RA on Prednisone 2.5 mg /qod ( failed Humira, Xeljanz / MTX  ), she  was started on Rinvoq ( Upadacitinib ) for RA. She had been on methotrexate but decided to stop it herlsef as she was on too many medications.   She is Garth Zuniga's .  Summary of prior workup: - Labs on 9/11/2023: WBC 6.5, hemoglobin 11.4, MCV 97, platelets 423. - E/C by LG on 2/5/21 normal esophagus, medium size hiatal hernia.  2 nonbleeding cratered gastric ulcers in the antrum.  Diffuse moderate edema, erosions and erythema throughout the antrum.  Normal duodenum.  Biopsies were negative for H. pylori or celiac sprue. On colonoscopy, there was descending and sigmoid diverticulosis as well as nonbleeding external hemorrhoids.  The quality of the prep was good.   - CT scan w Dr Busby: umbilicla hernia.   - GES 12/21: severe gastroparesis. - EGD and colonoscopy by Dr. Michael Hickman in 2015. As per the patient, these were both normal

## 2024-09-16 ENCOUNTER — CLAIMS CREATED FROM THE CLAIM WINDOW (OUTPATIENT)
Dept: URBAN - METROPOLITAN AREA MEDICAL CENTER 27 | Facility: MEDICAL CENTER | Age: 79
End: 2024-09-16

## 2024-09-16 PROCEDURE — 99254 IP/OBS CNSLTJ NEW/EST MOD 60: CPT | Performed by: INTERNAL MEDICINE

## 2024-09-17 ENCOUNTER — CLAIMS CREATED FROM THE CLAIM WINDOW (OUTPATIENT)
Dept: URBAN - METROPOLITAN AREA MEDICAL CENTER 27 | Facility: MEDICAL CENTER | Age: 79
End: 2024-09-17

## 2024-09-17 PROCEDURE — 99233 SBSQ HOSP IP/OBS HIGH 50: CPT | Performed by: STUDENT IN AN ORGANIZED HEALTH CARE EDUCATION/TRAINING PROGRAM

## 2024-09-18 ENCOUNTER — CLAIMS CREATED FROM THE CLAIM WINDOW (OUTPATIENT)
Dept: URBAN - METROPOLITAN AREA MEDICAL CENTER 27 | Facility: MEDICAL CENTER | Age: 79
End: 2024-09-18

## 2024-09-18 PROCEDURE — 43235 EGD DIAGNOSTIC BRUSH WASH: CPT | Performed by: STUDENT IN AN ORGANIZED HEALTH CARE EDUCATION/TRAINING PROGRAM

## 2024-09-24 ENCOUNTER — WEB ENCOUNTER (OUTPATIENT)
Dept: URBAN - METROPOLITAN AREA CLINIC 78 | Facility: CLINIC | Age: 79
End: 2024-09-24

## 2024-09-26 ENCOUNTER — WEB ENCOUNTER (OUTPATIENT)
Dept: URBAN - METROPOLITAN AREA CLINIC 78 | Facility: CLINIC | Age: 79
End: 2024-09-26

## 2024-10-07 ENCOUNTER — OFFICE VISIT (OUTPATIENT)
Dept: URBAN - METROPOLITAN AREA TELEHEALTH 2 | Facility: TELEHEALTH | Age: 79
End: 2024-10-07

## 2024-10-18 ENCOUNTER — TELEPHONE ENCOUNTER (OUTPATIENT)
Dept: URBAN - METROPOLITAN AREA CLINIC 78 | Facility: CLINIC | Age: 79
End: 2024-10-18

## 2024-11-05 ENCOUNTER — OFFICE VISIT (OUTPATIENT)
Dept: URBAN - METROPOLITAN AREA CLINIC 78 | Facility: CLINIC | Age: 79
End: 2024-11-05
Payer: MEDICARE

## 2024-11-05 VITALS
TEMPERATURE: 98 F | WEIGHT: 139 LBS | BODY MASS INDEX: 24.63 KG/M2 | DIASTOLIC BLOOD PRESSURE: 72 MMHG | HEART RATE: 78 BPM | SYSTOLIC BLOOD PRESSURE: 118 MMHG | HEIGHT: 63 IN

## 2024-11-05 DIAGNOSIS — K31.84 GASTROPARESIS: ICD-10-CM

## 2024-11-05 DIAGNOSIS — K27.9 PEPTIC ULCER DISEASE: ICD-10-CM

## 2024-11-05 DIAGNOSIS — I48.0 PAROXYSMAL ATRIAL FIBRILLATION: ICD-10-CM

## 2024-11-05 DIAGNOSIS — R19.06 EPIGASTRIC FULLNESS: ICD-10-CM

## 2024-11-05 DIAGNOSIS — M05.79 RHEUMATOID ARTHRITIS INVOLVING MULTIPLE SITES WITH POSITIVE RHEUMATOID FACTOR: ICD-10-CM

## 2024-11-05 DIAGNOSIS — E73.9 LACTOSE INTOLERANCE: ICD-10-CM

## 2024-11-05 DIAGNOSIS — D50.0 IRON DEFICIENCY ANEMIA DUE TO CHRONIC BLOOD LOSS: ICD-10-CM

## 2024-11-05 DIAGNOSIS — K21.9 GERD WITHOUT ESOPHAGITIS: ICD-10-CM

## 2024-11-05 DIAGNOSIS — K59.01 SLOW TRANSIT CONSTIPATION: ICD-10-CM

## 2024-11-05 DIAGNOSIS — K57.90 DIVERTICULOSIS: ICD-10-CM

## 2024-11-05 PROBLEM — 282825002: Status: ACTIVE | Noted: 2024-11-05

## 2024-11-05 PROBLEM — 13200003: Status: ACTIVE | Noted: 2024-11-05

## 2024-11-05 PROCEDURE — 99215 OFFICE O/P EST HI 40 MIN: CPT | Performed by: INTERNAL MEDICINE

## 2024-11-05 RX ORDER — PRUCALOPRIDE 1 MG/1
1 TABLET TABLET, FILM COATED ORAL ONCE A DAY
Qty: 30 | Refills: 3 | Status: ON HOLD | COMMUNITY

## 2024-11-05 RX ORDER — FOLIC ACID 1 MG/1
TABLET ORAL
Qty: 0 | Refills: 0 | Status: ACTIVE | COMMUNITY
Start: 2016-09-19

## 2024-11-05 RX ORDER — ATORVASTATIN CALCIUM 10 MG/1
TABLET, FILM COATED ORAL
Qty: 0 | Refills: 0 | Status: ACTIVE | COMMUNITY
Start: 2016-06-16

## 2024-11-05 RX ORDER — FAMOTIDINE 40 MG/1
TAKE 1 TABLET AT BEDTIME TABLET, FILM COATED ORAL
Qty: 90 TABLET | Refills: 0 | Status: ON HOLD | COMMUNITY

## 2024-11-05 RX ORDER — PANTOPRAZOLE SODIUM 40 MG/1
1 TABLET TABLET, DELAYED RELEASE ORAL
Qty: 30 | Refills: 2 | OUTPATIENT
Start: 2024-11-05

## 2024-11-05 RX ORDER — RIVAROXABAN 15 MG/1
1 TABLET WITH FOOD TABLET, FILM COATED ORAL ONCE A DAY
Status: ACTIVE | COMMUNITY

## 2024-11-05 RX ORDER — APIXABAN 2.5 MG/1
AS DIRECTED TABLET, FILM COATED ORAL
Status: ON HOLD | COMMUNITY

## 2024-11-05 RX ORDER — PRUCALOPRIDE 1 MG/1
1 TABLET TABLET, FILM COATED ORAL ONCE A DAY
Qty: 30 | Refills: 3 | OUTPATIENT
Start: 2024-11-05 | End: 2025-03-05

## 2024-11-05 RX ORDER — UPADACITINIB 15 MG/1
1 TABLET TABLET, EXTENDED RELEASE ORAL ONCE A DAY
Status: ACTIVE | COMMUNITY

## 2024-11-05 RX ORDER — PANTOPRAZOLE SODIUM 40 MG/1
TAKE 1 TABLET ONE TIME DAILY TABLET, DELAYED RELEASE ORAL
Qty: 90 TABLET | Refills: 1 | Status: ON HOLD | COMMUNITY

## 2024-11-05 RX ORDER — TOFACITINIB 11 MG/1
1 TABLET TABLET, FILM COATED, EXTENDED RELEASE ORAL ONCE A DAY
Status: ON HOLD | COMMUNITY

## 2024-11-05 NOTE — PHYSICAL EXAM CONSTITUTIONAL:
b/l SAH well developed, well nourished , in no acute distress , ambulating without difficulty , normal communication ability b/l SAH, IPH

## 2024-11-05 NOTE — HPI-TODAY'S VISIT:
The patient was referred to us by  Dr. Elaine Young for early satiety. She comes in today following a recent inpt stay at [facility] for GI bleed. A copy of this note will be sent to the referring physician.  Patient had been on a trip and went on a long hike. After she returned back home she felt extremely fatigued and was unable to almost get out of bed for about 4 days. At that point her family members took her to [facility] thinking she was dehydrated. There she was found to be severely anemic. Vane Torre performed an EGD which revealed 3 large clean-based gastric ulcers. Shivam denies melena or BRBPR.   She was diagnosed with gastroparesis.  She is having a feeling of epigastric fullness postprandially. She does not feel much hunger and is eating because she knows she has to eat. She has not had any issues with nausea or vomiitng. Her weight has remained stable  She has been eating a lot more vegetables and fruits.    She is now having a BM daily and has a sense of complete evacuation.  Previously she could easily go about ~1 week without having a BM. She has been on Metamucil and Miralax in the past. She did not feel that these helped. Micheal Peña started her on Amitiza 8mcg QD (BID dosing caused her to have diarrhea). She has been consuming berries with alma and flax seeds. She is now havnig a BM every 2-3 days. The stools are a bit softer.    She has ARSLAN sxs relatively well controlled on Pantoprazole. Famotidine caused bloating; she tried Cimetidine OTC in the past with good results.   The patient reports that she was diagnosed with H pylori infection in the summer of 2015. She was treated with an antibiotic regimen for 2 weeks. Eradication has since been confirmed.   She has lactose intolerance. She will use Lactaid as needed. Even when she drinks lactose free milk, she gets very gassy.  She is on Sotalol and Xarelto.  She is now off Xarelto and wondering whether she can go back on it.   She is now on Tramadol for pain. When she has taken  She is also wondering whether she can resume her B12 and zinc.   She has a history of AYAAN and seropositive RA on Prednisone 2.5 mg /qod ( failed Humira, Xeljanz / MTX  ), she  was started on Rinvoq ( Upadacitinib ) for RA. She had been on methotrexate but decided to stop it herlsef as she was on too many medications.  The Rinvoq was stopped at time of discharge.  She is Garth Zuniga's .  Summary of prior workup: - EGD by Vane Torre on 10/24: 3 clean based Mary. Normal duodenum.  - Labs on 9/11/2023: WBC 6.5, hemoglobin 11.4, MCV 97, platelets 423. - E/C by LG on 2/5/21 normal esophagus, medium size hiatal hernia.  2 nonbleeding cratered gastric ulcers in the antrum.  Diffuse moderate edema, erosions and erythema throughout the antrum.  Normal duodenum.  Biopsies were negative for H. pylori or celiac sprue. On colonoscopy, there was descending and sigmoid diverticulosis as well as nonbleeding external hemorrhoids.  The quality of the prep was good.   - CT scan w Dr Busby: umbilicla hernia.   - GES 12/21: severe gastroparesis. - EGD and colonoscopy by Dr. Michael Hickman in 2015. As per the patient, these were both normal
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
## 2024-11-15 ENCOUNTER — WEB ENCOUNTER (OUTPATIENT)
Dept: URBAN - METROPOLITAN AREA CLINIC 78 | Facility: CLINIC | Age: 79
End: 2024-11-15

## 2024-11-15 RX ORDER — PRUCALOPRIDE 1 MG/1
1 TABLET TABLET, FILM COATED ORAL ONCE A DAY
Qty: 30 | Refills: 3
Start: 2024-11-05 | End: 2025-03-27

## 2024-11-15 RX ORDER — TENAPANOR HYDROCHLORIDE 53.2 MG/1
1 TABLET IMMEDIATELY BEFORE MEALS TABLET ORAL TWICE A DAY
Qty: 60 | OUTPATIENT
Start: 2024-11-27 | End: 2024-12-27

## 2024-11-27 ENCOUNTER — TELEPHONE ENCOUNTER (OUTPATIENT)
Dept: URBAN - METROPOLITAN AREA CLINIC 78 | Facility: CLINIC | Age: 79
End: 2024-11-27

## 2024-12-02 ENCOUNTER — TELEPHONE ENCOUNTER (OUTPATIENT)
Dept: URBAN - METROPOLITAN AREA CLINIC 78 | Facility: CLINIC | Age: 79
End: 2024-12-02

## 2024-12-17 ENCOUNTER — OFFICE VISIT (OUTPATIENT)
Dept: URBAN - METROPOLITAN AREA CLINIC 78 | Facility: CLINIC | Age: 79
End: 2024-12-17

## 2025-01-07 ENCOUNTER — OFFICE VISIT (OUTPATIENT)
Dept: URBAN - METROPOLITAN AREA CLINIC 78 | Facility: CLINIC | Age: 80
End: 2025-01-07
Payer: MEDICARE

## 2025-01-07 VITALS
HEIGHT: 63 IN | SYSTOLIC BLOOD PRESSURE: 107 MMHG | HEART RATE: 79 BPM | WEIGHT: 141 LBS | TEMPERATURE: 98 F | BODY MASS INDEX: 24.98 KG/M2 | DIASTOLIC BLOOD PRESSURE: 68 MMHG

## 2025-01-07 DIAGNOSIS — M05.79 RHEUMATOID ARTHRITIS INVOLVING MULTIPLE SITES WITH POSITIVE RHEUMATOID FACTOR: ICD-10-CM

## 2025-01-07 DIAGNOSIS — K59.01 SLOW TRANSIT CONSTIPATION: ICD-10-CM

## 2025-01-07 DIAGNOSIS — K57.90 DIVERTICULOSIS: ICD-10-CM

## 2025-01-07 DIAGNOSIS — I48.0 PAROXYSMAL ATRIAL FIBRILLATION: ICD-10-CM

## 2025-01-07 DIAGNOSIS — R19.06 EPIGASTRIC FULLNESS: ICD-10-CM

## 2025-01-07 DIAGNOSIS — D50.0 IRON DEFICIENCY ANEMIA DUE TO CHRONIC BLOOD LOSS: ICD-10-CM

## 2025-01-07 DIAGNOSIS — E73.9 LACTOSE INTOLERANCE: ICD-10-CM

## 2025-01-07 DIAGNOSIS — K21.9 GERD WITHOUT ESOPHAGITIS: ICD-10-CM

## 2025-01-07 DIAGNOSIS — K31.84 GASTROPARESIS: ICD-10-CM

## 2025-01-07 DIAGNOSIS — M54.2 NECK PAIN: ICD-10-CM

## 2025-01-07 DIAGNOSIS — K27.9 PEPTIC ULCER DISEASE: ICD-10-CM

## 2025-01-07 PROBLEM — 81680005: Status: ACTIVE | Noted: 2025-01-07

## 2025-01-07 PROCEDURE — 99214 OFFICE O/P EST MOD 30 MIN: CPT | Performed by: INTERNAL MEDICINE

## 2025-01-07 RX ORDER — UPADACITINIB 15 MG/1
1 TABLET TABLET, EXTENDED RELEASE ORAL ONCE A DAY
Status: ACTIVE | COMMUNITY

## 2025-01-07 RX ORDER — PRUCALOPRIDE 1 MG/1
1 TABLET TABLET, FILM COATED ORAL ONCE A DAY
Qty: 30 | Refills: 3 | Status: ACTIVE | COMMUNITY
Start: 2024-11-05 | End: 2025-03-27

## 2025-01-07 RX ORDER — PANTOPRAZOLE SODIUM 40 MG/1
1 TABLET 30 MINUTES BEFORE BREAKFAST AND 30 MINUTES BEFORE DINNER TABLET, DELAYED RELEASE ORAL TWICE DAILY
Qty: 60 | Refills: 1 | OUTPATIENT

## 2025-01-07 RX ORDER — PANTOPRAZOLE SODIUM 40 MG/1
1 TABLET TABLET, DELAYED RELEASE ORAL
Qty: 30 | Refills: 2 | Status: ACTIVE | COMMUNITY
Start: 2024-11-05

## 2025-01-07 RX ORDER — APIXABAN 2.5 MG/1
AS DIRECTED TABLET, FILM COATED ORAL
Status: ON HOLD | COMMUNITY

## 2025-01-07 RX ORDER — ATORVASTATIN CALCIUM 10 MG/1
TABLET, FILM COATED ORAL
Qty: 0 | Refills: 0 | Status: ACTIVE | COMMUNITY
Start: 2016-06-16

## 2025-01-07 RX ORDER — PANTOPRAZOLE SODIUM 40 MG/1
TAKE 1 TABLET ONE TIME DAILY TABLET, DELAYED RELEASE ORAL
Qty: 90 TABLET | Refills: 1 | Status: ON HOLD | COMMUNITY

## 2025-01-07 RX ORDER — FOLIC ACID 1 MG/1
TABLET ORAL
Qty: 0 | Refills: 0 | Status: ACTIVE | COMMUNITY
Start: 2016-09-19

## 2025-01-07 RX ORDER — PRUCALOPRIDE 1 MG/1
1 TABLET TABLET, FILM COATED ORAL ONCE A DAY
Qty: 30 | Refills: 3 | OUTPATIENT

## 2025-01-07 RX ORDER — FAMOTIDINE 40 MG/1
TAKE 1 TABLET AT BEDTIME TABLET, FILM COATED ORAL
Qty: 90 TABLET | Refills: 0 | Status: ON HOLD | COMMUNITY

## 2025-01-07 RX ORDER — TOFACITINIB 11 MG/1
1 TABLET TABLET, FILM COATED, EXTENDED RELEASE ORAL ONCE A DAY
Status: ON HOLD | COMMUNITY

## 2025-01-07 RX ORDER — PRUCALOPRIDE 1 MG/1
1 TABLET TABLET, FILM COATED ORAL ONCE A DAY
Qty: 30 | Refills: 3 | Status: ON HOLD | COMMUNITY

## 2025-01-07 RX ORDER — RIVAROXABAN 15 MG/1
1 TABLET WITH FOOD TABLET, FILM COATED ORAL ONCE A DAY
Status: ACTIVE | COMMUNITY

## 2025-01-07 NOTE — HPI-TODAY'S VISIT:
The patient was referred to us by  Dr. Elaine Young for early satiety and constipation. A copy of this note will be sent to the referring physician.  Patient had been on a trip and went on a long hike in 2024. After she returned back home she felt extremely fatigued and was unable to get out of bed for ~ 4 days. She was found to be severely anemic. Vane Nicho performed an EGD which revealed 3 large clean-based gastric ulcers. Shivam denies melena.  She was able to get the Motegrity at no cost, but has not yet started it as she did not know how she was going ot respond to it and did not want to trial it during the recent Holidays.  She has had a few scattered episodes BRBPR noted on the TP. These tend to be short lived.   She has been Pantoprazole QAM. She has been having an epigastric burning at night though.  Famotidine caused bloating; she tried Cimetidine OTC in the past with good results.   She is wondering if she can start using a probiotic.  She was diagnosed with gastroparesis.  She has a feeling of epigastric fullness postprandially. She has not had any issues with nausea or vomiitng. Her weight has remained stable She has been eating a lot more vegetables and fruits, especially in the way of smoothies.    She can easily go about ~1 week without having a BM. She has been on Metamucil and Miralax in the past. She did not feel that these helped. Micheal Peña started her on Amitiza 8mcg QD (BID dosing caused her to have diarrhea). She has been consuming berries with alma and flax seeds. She is now havnig a BM every 2-3 days. The stools are a bit softer.    The patient reports that she was diagnosed with H pylori infection in the summer of 2015. She was treated with an antibiotic regimen for 2 weeks. Eradication has since been confirmed.   She has lactose intolerance. She will use Lactaid as needed. Even when she drinks lactose free milk, she gets very gassy.  She is on Sotalol and Xarelto.  She has a history of AYAAN and seropositive RA on Prednisone 2.5 mg /qod ( failed Humira, Xeljanz / MTX  ), she  was started on Rinvoq ( Upadacitinib ) for RA. She had been on methotrexate but decided to stop it herlsef as she was on too many medications. The Rinvoq was stopped at time of discharge. She is now on Methotrexate once a week and Prednisone 2.5 mg daily,.  She is having neck pain and is wanting me to write a prescription for Tramadol prn.  She is Garth Zuniga's GM.  Summary of prior workup: - EGD by Vane Torre on 10/24: 3 clean based Mary. Normal duodenum.  - Labs on 9/11/2023: WBC 6.5, hemoglobin 11.4, MCV 97, platelets 423. - E/C by LG on 2/5/21 normal esophagus, medium size hiatal hernia.  2 nonbleeding cratered gastric ulcers in the antrum.  Diffuse moderate edema, erosions and erythema throughout the antrum.  Normal duodenum.  Biopsies were negative for H. pylori or celiac sprue. On colonoscopy, there was descending and sigmoid diverticulosis as well as nonbleeding external hemorrhoids.  The quality of the prep was good.   - CT scan w Dr Busyb: umbilicla hernia.   - GES 12/21: severe gastroparesis. - EGD and colonoscopy by Dr. Michael Hickman in 2015. As per the patient, these were both normal

## 2025-03-06 ENCOUNTER — OFFICE VISIT (OUTPATIENT)
Dept: URBAN - METROPOLITAN AREA CLINIC 78 | Facility: CLINIC | Age: 80
End: 2025-03-06
Payer: MEDICARE

## 2025-03-06 VITALS
HEART RATE: 76 BPM | DIASTOLIC BLOOD PRESSURE: 65 MMHG | TEMPERATURE: 98 F | BODY MASS INDEX: 24.8 KG/M2 | HEIGHT: 63 IN | WEIGHT: 140 LBS | SYSTOLIC BLOOD PRESSURE: 107 MMHG

## 2025-03-06 DIAGNOSIS — K57.90 DIVERTICULOSIS: ICD-10-CM

## 2025-03-06 DIAGNOSIS — K21.9 GERD WITHOUT ESOPHAGITIS: ICD-10-CM

## 2025-03-06 DIAGNOSIS — D50.0 IRON DEFICIENCY ANEMIA DUE TO CHRONIC BLOOD LOSS: ICD-10-CM

## 2025-03-06 DIAGNOSIS — M05.79 RHEUMATOID ARTHRITIS INVOLVING MULTIPLE SITES WITH POSITIVE RHEUMATOID FACTOR: ICD-10-CM

## 2025-03-06 DIAGNOSIS — E73.9 LACTOSE INTOLERANCE: ICD-10-CM

## 2025-03-06 DIAGNOSIS — K27.9 PEPTIC ULCER DISEASE: ICD-10-CM

## 2025-03-06 DIAGNOSIS — R19.06 EPIGASTRIC FULLNESS: ICD-10-CM

## 2025-03-06 DIAGNOSIS — I48.0 PAROXYSMAL ATRIAL FIBRILLATION: ICD-10-CM

## 2025-03-06 DIAGNOSIS — K59.01 SLOW TRANSIT CONSTIPATION: ICD-10-CM

## 2025-03-06 DIAGNOSIS — K31.84 GASTROPARESIS: ICD-10-CM

## 2025-03-06 PROCEDURE — 99214 OFFICE O/P EST MOD 30 MIN: CPT | Performed by: INTERNAL MEDICINE

## 2025-03-06 RX ORDER — UPADACITINIB 15 MG/1
1 TABLET TABLET, EXTENDED RELEASE ORAL ONCE A DAY
Status: ACTIVE | COMMUNITY

## 2025-03-06 RX ORDER — ATORVASTATIN CALCIUM 10 MG/1
TABLET, FILM COATED ORAL
Qty: 0 | Refills: 0 | Status: ACTIVE | COMMUNITY
Start: 2016-06-16

## 2025-03-06 RX ORDER — RIVAROXABAN 15 MG/1
1 TABLET WITH FOOD TABLET, FILM COATED ORAL ONCE A DAY
Status: ACTIVE | COMMUNITY

## 2025-03-06 RX ORDER — PANTOPRAZOLE SODIUM 40 MG/1
TAKE 1 TABLET ONE TIME DAILY TABLET, DELAYED RELEASE ORAL
Qty: 90 TABLET | Refills: 1 | Status: ON HOLD | COMMUNITY

## 2025-03-06 RX ORDER — PRUCALOPRIDE 1 MG/1
1 TABLET TABLET, FILM COATED ORAL ONCE A DAY
Qty: 30 | Refills: 3 | Status: ON HOLD | COMMUNITY

## 2025-03-06 RX ORDER — PRUCALOPRIDE 1 MG/1
1 TABLET TABLET, FILM COATED ORAL ONCE A DAY
Qty: 30 | Refills: 3 | Status: ACTIVE | COMMUNITY

## 2025-03-06 RX ORDER — PANTOPRAZOLE SODIUM 40 MG/1
1 TABLET 30 MINUTES BEFORE BREAKFAST AND 30 MINUTES BEFORE DINNER TABLET, DELAYED RELEASE ORAL TWICE DAILY
Qty: 60 | Refills: 1 | Status: ACTIVE | COMMUNITY

## 2025-03-06 RX ORDER — FAMOTIDINE 40 MG/1
TAKE 1 TABLET AT BEDTIME TABLET, FILM COATED ORAL
Qty: 90 TABLET | Refills: 0 | Status: ON HOLD | COMMUNITY

## 2025-03-06 RX ORDER — FOLIC ACID 1 MG/1
TABLET ORAL
Qty: 0 | Refills: 0 | Status: ACTIVE | COMMUNITY
Start: 2016-09-19

## 2025-03-06 RX ORDER — SUCRALFATE 1 G/1
1 TABLET TABLET ORAL
Qty: 60 TABLET | Refills: 1 | OUTPATIENT
Start: 2025-03-06 | End: 2025-05-05

## 2025-03-06 RX ORDER — APIXABAN 2.5 MG/1
AS DIRECTED TABLET, FILM COATED ORAL
Status: ON HOLD | COMMUNITY

## 2025-03-06 RX ORDER — TOFACITINIB 11 MG/1
1 TABLET TABLET, FILM COATED, EXTENDED RELEASE ORAL ONCE A DAY
Status: ON HOLD | COMMUNITY

## 2025-03-06 NOTE — HPI-TODAY'S VISIT:
The patient was referred to us by  Dr. Elaine Young for early satiety and constipation. A copy of this note will be sent to the referring physician.  In 2024, she was found to be severely anemic. Vane Torre performed an EGD which revealed 3 large clean-based gastric ulcers.  She had been Pantoprazole QAM. She has been having epigastric burning at night though. She tried increasing it to BID but esme has daily epigastric discomfort. Famotidine caused bloating; she tried Cimetidine OTC in the past with good results.   She is wondering if she can start using a probiotic.  Shivam denies melena.  She was diagnosed with gastroparesis.  She has a feeling of epigastric fullness postprandially. She has not had any issues with nausea or vomiitng. Her weight has remained stable She has been eating a lot more vegetables and fruits, especially in the way of smoothies.    She can easily go about ~1 week without having a BM. Metamucil/Miralax did not help. Micheal Obrien started her on Amitiza 8mcg QD (BID dosing caused her to have diarrhea). I recommended she try Motegrity 1mg daily, which she states "is extremely efficient." At times she has had fecal incontinence from this. Hence she is not taking it every day just on days she knows she will not be leaving the house. She has also been using Mg x 2 at night. This will also trigger diarrhea.I have advised that she use only one 1 at night or every other night. She has noted less dyspepsia however since being able to have more regular BMs  The patient reports that she was diagnosed with H pylori infection in the summer of 2015. She was treated with an antibiotic regimen for 2 weeks. Eradication has since been confirmed.   She has lactose intolerance. She will use Lactaid as needed. Even when she drinks lactose free milk, she gets very gassy.  She is on Sotalol and Xarelto.  She has a history of AYAAN and seropositive RA. She failed Humira & Xeljanz. Rinvoq ( Upadacitinib ) was stopped after she developed PUD in 2024.  She is now on Methotrexate once a week and Prednisone 2.5 mg daily,.  She is Garth Zuniga's .  Summary of prior workup: - EGD by Vane Torre on 10/24: 3 clean based Mary. Normal duodenum.  - Labs on 9/11/2023: WBC 6.5, hemoglobin 11.4, MCV 97, platelets 423. - E/C by LG on 2/5/21 normal esophagus, medium size hiatal hernia.  2 nonbleeding cratered gastric ulcers in the antrum.  Diffuse moderate edema, erosions and erythema throughout the antrum.  Normal duodenum.  Biopsies were negative for H. pylori or celiac sprue. On colonoscopy, there was descending and sigmoid diverticulosis as well as nonbleeding external hemorrhoids.  The quality of the prep was good.   - CT scan w Dr Busby: umbilicla hernia.   - GES 12/21: severe gastroparesis. - EGD and colonoscopy by Dr. Michael Hickman in 2015. As per the patient, these were both normal

## 2025-05-12 ENCOUNTER — OFFICE VISIT (OUTPATIENT)
Dept: URBAN - METROPOLITAN AREA CLINIC 78 | Facility: CLINIC | Age: 80
End: 2025-05-12
Payer: MEDICARE

## 2025-05-12 DIAGNOSIS — K59.01 SLOW TRANSIT CONSTIPATION: ICD-10-CM

## 2025-05-12 DIAGNOSIS — R19.06 EPIGASTRIC FULLNESS: ICD-10-CM

## 2025-05-12 DIAGNOSIS — K27.9 PEPTIC ULCER DISEASE: ICD-10-CM

## 2025-05-12 DIAGNOSIS — K57.90 DIVERTICULOSIS: ICD-10-CM

## 2025-05-12 DIAGNOSIS — M05.79 RHEUMATOID ARTHRITIS INVOLVING MULTIPLE SITES WITH POSITIVE RHEUMATOID FACTOR: ICD-10-CM

## 2025-05-12 DIAGNOSIS — D50.0 IRON DEFICIENCY ANEMIA DUE TO CHRONIC BLOOD LOSS: ICD-10-CM

## 2025-05-12 DIAGNOSIS — I48.0 PAROXYSMAL ATRIAL FIBRILLATION: ICD-10-CM

## 2025-05-12 DIAGNOSIS — K31.84 GASTROPARESIS: ICD-10-CM

## 2025-05-12 DIAGNOSIS — E73.9 LACTOSE INTOLERANCE: ICD-10-CM

## 2025-05-12 DIAGNOSIS — K21.9 GERD WITHOUT ESOPHAGITIS: ICD-10-CM

## 2025-05-12 PROCEDURE — 99214 OFFICE O/P EST MOD 30 MIN: CPT | Performed by: INTERNAL MEDICINE

## 2025-05-12 RX ORDER — TOFACITINIB 11 MG/1
1 TABLET TABLET, FILM COATED, EXTENDED RELEASE ORAL ONCE A DAY
Status: ON HOLD | COMMUNITY

## 2025-05-12 RX ORDER — APIXABAN 2.5 MG/1
AS DIRECTED TABLET, FILM COATED ORAL
Status: ON HOLD | COMMUNITY

## 2025-05-12 RX ORDER — ATORVASTATIN CALCIUM 10 MG/1
TABLET, FILM COATED ORAL
Qty: 0 | Refills: 0 | Status: ACTIVE | COMMUNITY
Start: 2016-06-16

## 2025-05-12 RX ORDER — PANTOPRAZOLE SODIUM 40 MG/1
TAKE 1 TABLET ONE TIME DAILY TABLET, DELAYED RELEASE ORAL
Qty: 90 TABLET | Refills: 1 | Status: ON HOLD | COMMUNITY

## 2025-05-12 RX ORDER — PANTOPRAZOLE SODIUM 40 MG/1
1 TABLET 30 MINUTES BEFORE BREAKFAST AND 30 MINUTES BEFORE DINNER TABLET, DELAYED RELEASE ORAL TWICE DAILY
Qty: 60 | Refills: 1 | Status: ACTIVE | COMMUNITY

## 2025-05-12 RX ORDER — PRUCALOPRIDE 1 MG/1
1 TABLET TABLET, FILM COATED ORAL ONCE A DAY
Qty: 30 | Refills: 3 | Status: ON HOLD | COMMUNITY

## 2025-05-12 RX ORDER — FAMOTIDINE 40 MG/1
TAKE 1 TABLET AT BEDTIME TABLET, FILM COATED ORAL
Qty: 90 TABLET | Refills: 0 | Status: ON HOLD | COMMUNITY

## 2025-05-12 RX ORDER — SUCRALFATE 1 G/1
TABLET ORAL
Qty: 120 TABLET | Status: ACTIVE | COMMUNITY

## 2025-05-12 RX ORDER — UPADACITINIB 15 MG/1
1 TABLET TABLET, EXTENDED RELEASE ORAL ONCE A DAY
Status: ACTIVE | COMMUNITY

## 2025-05-12 RX ORDER — FOLIC ACID 1 MG/1
TABLET ORAL
Qty: 0 | Refills: 0 | Status: ACTIVE | COMMUNITY
Start: 2016-09-19

## 2025-05-12 RX ORDER — RIVAROXABAN 15 MG/1
1 TABLET WITH FOOD TABLET, FILM COATED ORAL ONCE A DAY
Status: ACTIVE | COMMUNITY

## 2025-05-12 RX ORDER — SOTALOL HYDROCHLORIDE 80 MG/1
TABLET ORAL
Qty: 90 TABLET | Status: ACTIVE | COMMUNITY

## 2025-05-12 RX ORDER — PRUCALOPRIDE 1 MG/1
1 TABLET TABLET, FILM COATED ORAL ONCE A DAY
Qty: 30 | Refills: 3 | Status: ACTIVE | COMMUNITY

## 2025-05-12 NOTE — HPI-TODAY'S VISIT:
The patient was referred to us by  Dr. Elaine Young for early satiety and constipation. A copy of this note will be sent to the referring physician.  In 2024, she was found to be severely anemic. Vane Torre performed an EGD which revealed 3 large clean-based gastric ulcers.  She had been on Pantoprazole QAM. She has been having epigastric burning at night though. She tried increasing it to BID but esme has daily epigastric discomfort. Famotidine caused bloating; she tried Cimetidine OTC in the past with good results.   She is wondering if she can start using a probiotic.  Shivam denies melena.  She was diagnosed with gastroparesis.  She has a feeling of epigastric fullness postprandially. She has not had any issues with nausea or vomiitng. Her weight has remained stable She has been eating a lot more vegetables and fruits, especially in the way of smoothies.    She can easily go about ~1 week without having a BM. Metamucil/Miralax did not help. Micheal Obrien started her on Amitiza 8mcg QD (BID dosing caused her to have diarrhea). I recommended she try Motegrity 1mg daily, which she states "is extremely efficient." At times she has had fecal incontinence from this. Hence she is not taking it every day just on days she knows she will not be leaving the house. She has also been using Mg x 2 at night. This will also trigger diarrhea.I have advised that she use only one 1 at night or every other night. She has noted less dyspepsia however since being able to have more regular BMs.  The patient reports that she was diagnosed with H pylori infection in the summer of 2015. She was treated with an antibiotic regimen for 2 weeks. Eradication has since been confirmed.   She tells me today she does not have as much appeitte. She has been consuming kefir and yogurt at home and has been adding berries and other dried fruits. Her weigh has remained stable.  She has been eating GF crackers.   She has lactose intolerance. She will use Lactaid as needed. Even when she drinks lactose free milk, she gets very gassy.  She is on Sotalol and Xarelto.  She has a history of AYAAN and seropositive RA. She failed Humira & Xeljanz. Rinvoq ( Upadacitinib ) was stopped after she developed PUD in 2024.  She is now on Methotrexate once a week and Prednisone 2.5 mg daily,.  She is Garth Zuniga's .  Summary of prior workup: - EGD by Vane Torre on 10/24: 3 clean based Mary. Normal duodenum.  - Labs on 9/11/2023: WBC 6.5, hemoglobin 11.4, MCV 97, platelets 423. - E/C by LG on 2/5/21 normal esophagus, medium size hiatal hernia.  2 nonbleeding cratered gastric ulcers in the antrum.  Diffuse moderate edema, erosions and erythema throughout the antrum.  Normal duodenum.  Biopsies were negative for H. pylori or celiac sprue. On colonoscopy, there was descending and sigmoid diverticulosis as well as nonbleeding external hemorrhoids.  The quality of the prep was good.   - CT scan w Dr Busby: umbilicla hernia.   - GES 12/21: severe gastroparesis. - EGD and colonoscopy by Dr. Michael Hickman in 2015. As per the patient, these were both normal

## 2025-06-24 ENCOUNTER — WEB ENCOUNTER (OUTPATIENT)
Dept: URBAN - METROPOLITAN AREA CLINIC 78 | Facility: CLINIC | Age: 80
End: 2025-06-24

## 2025-06-25 ENCOUNTER — OFFICE VISIT (OUTPATIENT)
Dept: URBAN - METROPOLITAN AREA CLINIC 78 | Facility: CLINIC | Age: 80
End: 2025-06-25
Payer: MEDICARE

## 2025-06-25 DIAGNOSIS — R10.30 LOWER ABDOMINAL PAIN: ICD-10-CM

## 2025-06-25 DIAGNOSIS — R19.7 ACUTE DIARRHEA: ICD-10-CM

## 2025-06-25 PROBLEM — 54586004: Status: ACTIVE | Noted: 2025-06-25

## 2025-06-25 PROBLEM — 409966000: Status: ACTIVE | Noted: 2025-06-25

## 2025-06-25 PROCEDURE — 99214 OFFICE O/P EST MOD 30 MIN: CPT

## 2025-06-25 RX ORDER — ATORVASTATIN CALCIUM 10 MG/1
TABLET, FILM COATED ORAL
Qty: 0 | Refills: 0 | Status: ACTIVE | COMMUNITY
Start: 2016-06-16

## 2025-06-25 RX ORDER — PRUCALOPRIDE 1 MG/1
1 TABLET TABLET, FILM COATED ORAL ONCE A DAY
Qty: 30 | Refills: 3 | Status: ACTIVE | COMMUNITY

## 2025-06-25 RX ORDER — FOLIC ACID 1 MG/1
TABLET ORAL
Qty: 0 | Refills: 0 | Status: ACTIVE | COMMUNITY
Start: 2016-09-19

## 2025-06-25 RX ORDER — TOFACITINIB 11 MG/1
1 TABLET TABLET, FILM COATED, EXTENDED RELEASE ORAL ONCE A DAY
Status: ON HOLD | COMMUNITY

## 2025-06-25 RX ORDER — PANTOPRAZOLE SODIUM 40 MG/1
TAKE 1 TABLET ONE TIME DAILY TABLET, DELAYED RELEASE ORAL
Qty: 90 TABLET | Refills: 1 | Status: ON HOLD | COMMUNITY

## 2025-06-25 RX ORDER — SUCRALFATE 1 G/1
TABLET ORAL
Qty: 120 TABLET | Status: ACTIVE | COMMUNITY

## 2025-06-25 RX ORDER — UPADACITINIB 15 MG/1
1 TABLET TABLET, EXTENDED RELEASE ORAL ONCE A DAY
Status: ACTIVE | COMMUNITY

## 2025-06-25 RX ORDER — PRUCALOPRIDE 1 MG/1
1 TABLET TABLET, FILM COATED ORAL ONCE A DAY
Qty: 30 | Refills: 3 | Status: ON HOLD | COMMUNITY

## 2025-06-25 RX ORDER — SOTALOL HYDROCHLORIDE 80 MG/1
TABLET ORAL
Qty: 90 TABLET | Status: ACTIVE | COMMUNITY

## 2025-06-25 RX ORDER — RIVAROXABAN 15 MG/1
1 TABLET WITH FOOD TABLET, FILM COATED ORAL ONCE A DAY
Status: ACTIVE | COMMUNITY

## 2025-06-25 RX ORDER — PANTOPRAZOLE SODIUM 40 MG/1
1 TABLET 30 MINUTES BEFORE BREAKFAST AND 30 MINUTES BEFORE DINNER TABLET, DELAYED RELEASE ORAL TWICE DAILY
Qty: 60 | Refills: 1 | Status: ACTIVE | COMMUNITY

## 2025-06-25 RX ORDER — APIXABAN 2.5 MG/1
AS DIRECTED TABLET, FILM COATED ORAL
Status: ON HOLD | COMMUNITY

## 2025-06-25 RX ORDER — FAMOTIDINE 40 MG/1
TAKE 1 TABLET AT BEDTIME TABLET, FILM COATED ORAL
Qty: 90 TABLET | Refills: 0 | Status: ON HOLD | COMMUNITY

## 2025-06-25 NOTE — HPI-TODAY'S VISIT:
79-year-old female, established patient, presents for eval of diarrhea after returning from [place] on the 18th. She is here with her daughter ( who provides the history)  and grandson (Garth Zuniga, who is having the same symptoms, but with additional severe abd pain, and fever)   She reports that she is only having mucus containing BM, about 2-3 liquid, no blood, but she reports that she is having black stool, attributed to taking the peptobismol. She is having urgency, incontinence and nocturnal symptoms as well .  She reports a constant lower abd burning sensations with no improvement after having a BM.  She denies N/V, GERD, dyspahgia, F/C.   ~~~   She has lactose intolerance. She will use Lactaid as needed. Even when she drinks lactose free milk, she gets very gassy.  She is on Sotalol and Xarelto.  She has a history of AYAAN and seropositive RA. She failed Humira & Xeljanz. Rinvoq ( Upadacitinib ) was stopped after she developed PUD in 2024.  She is now on Methotrexate once a week and Prednisone 2.5 mg daily,.  She is Garth Zuniga's .  Summary of prior workup: - EGD by Vane Torre on 10/24: 3 clean based Mary. Normal duodenum.  - Labs on 9/11/2023: WBC 6.5, hemoglobin 11.4, MCV 97, platelets 423. - E/C by LG on 2/5/21 normal esophagus, medium size hiatal hernia. 2 nonbleeding cratered gastric ulcers in the antrum. Diffuse moderate edema, erosions and erythema throughout the antrum. Normal duodenum. Biopsies were negative for H. pylori or celiac sprue. On colonoscopy, there was descending and sigmoid diverticulosis as well as nonbleeding external hemorrhoids. The quality of the prep was good. - CT scan w Dr Busby: umbilicla hernia. - GES 12/21: severe gastroparesis. - EGD and colonoscopy by Dr. Michael Hickman in 2015. As per the patient, these were both normal

## 2025-07-08 ENCOUNTER — OFFICE VISIT (OUTPATIENT)
Dept: URBAN - METROPOLITAN AREA CLINIC 78 | Facility: CLINIC | Age: 80
End: 2025-07-08
Payer: MEDICARE

## 2025-07-08 DIAGNOSIS — M05.79 RHEUMATOID ARTHRITIS INVOLVING MULTIPLE SITES WITH POSITIVE RHEUMATOID FACTOR: ICD-10-CM

## 2025-07-08 DIAGNOSIS — R19.06 EPIGASTRIC FULLNESS: ICD-10-CM

## 2025-07-08 DIAGNOSIS — E73.9 LACTOSE INTOLERANCE: ICD-10-CM

## 2025-07-08 DIAGNOSIS — I48.0 PAROXYSMAL ATRIAL FIBRILLATION: ICD-10-CM

## 2025-07-08 DIAGNOSIS — K31.84 GASTROPARESIS: ICD-10-CM

## 2025-07-08 DIAGNOSIS — K59.01 SLOW TRANSIT CONSTIPATION: ICD-10-CM

## 2025-07-08 DIAGNOSIS — K27.9 PEPTIC ULCER DISEASE: ICD-10-CM

## 2025-07-08 DIAGNOSIS — D50.0 IRON DEFICIENCY ANEMIA DUE TO CHRONIC BLOOD LOSS: ICD-10-CM

## 2025-07-08 DIAGNOSIS — K57.90 DIVERTICULOSIS: ICD-10-CM

## 2025-07-08 DIAGNOSIS — R19.7 ACUTE DIARRHEA: ICD-10-CM

## 2025-07-08 DIAGNOSIS — R10.13 EPIGASTRIC BURNING SENSATION: ICD-10-CM

## 2025-07-08 PROCEDURE — 99214 OFFICE O/P EST MOD 30 MIN: CPT | Performed by: INTERNAL MEDICINE

## 2025-07-08 RX ORDER — SOTALOL HYDROCHLORIDE 80 MG/1
TABLET ORAL
Qty: 90 TABLET | Status: ACTIVE | COMMUNITY

## 2025-07-08 RX ORDER — RIVAROXABAN 15 MG/1
1 TABLET WITH FOOD TABLET, FILM COATED ORAL ONCE A DAY
Status: ACTIVE | COMMUNITY

## 2025-07-08 RX ORDER — FOLIC ACID 1 MG/1
TABLET ORAL
Qty: 0 | Refills: 0 | Status: ACTIVE | COMMUNITY
Start: 2016-09-19

## 2025-07-08 RX ORDER — ATORVASTATIN CALCIUM 10 MG/1
TABLET, FILM COATED ORAL
Qty: 0 | Refills: 0 | Status: ACTIVE | COMMUNITY
Start: 2016-06-16

## 2025-07-08 RX ORDER — APIXABAN 2.5 MG/1
AS DIRECTED TABLET, FILM COATED ORAL
Status: ON HOLD | COMMUNITY

## 2025-07-08 RX ORDER — UPADACITINIB 15 MG/1
1 TABLET TABLET, EXTENDED RELEASE ORAL ONCE A DAY
Status: ACTIVE | COMMUNITY

## 2025-07-08 RX ORDER — PRUCALOPRIDE 1 MG/1
1 TABLET TABLET, FILM COATED ORAL ONCE A DAY
Qty: 30 | Refills: 3 | Status: ACTIVE | COMMUNITY

## 2025-07-08 RX ORDER — PRUCALOPRIDE 1 MG/1
1 TABLET TABLET, FILM COATED ORAL ONCE A DAY
Qty: 30 | Refills: 3 | Status: ON HOLD | COMMUNITY

## 2025-07-08 RX ORDER — TOFACITINIB 11 MG/1
1 TABLET TABLET, FILM COATED, EXTENDED RELEASE ORAL ONCE A DAY
Status: ON HOLD | COMMUNITY

## 2025-07-08 RX ORDER — PANTOPRAZOLE SODIUM 40 MG/1
1 TABLET 30 MINUTES BEFORE BREAKFAST AND 30 MINUTES BEFORE DINNER TABLET, DELAYED RELEASE ORAL TWICE DAILY
Qty: 60 | Refills: 1 | Status: ACTIVE | COMMUNITY

## 2025-07-08 RX ORDER — SUCRALFATE 1 G/1
TABLET ORAL
Qty: 120 TABLET | Status: ACTIVE | COMMUNITY

## 2025-07-08 RX ORDER — PANTOPRAZOLE SODIUM 40 MG/1
TAKE 1 TABLET ONE TIME DAILY TABLET, DELAYED RELEASE ORAL
Qty: 90 TABLET | Refills: 1 | Status: ON HOLD | COMMUNITY

## 2025-07-08 RX ORDER — FAMOTIDINE 40 MG/1
TAKE 1 TABLET AT BEDTIME TABLET, FILM COATED ORAL
Qty: 90 TABLET | Refills: 0 | Status: ON HOLD | COMMUNITY

## 2025-07-08 NOTE — HPI-TODAY'S VISIT:
The patient was referred to us by  Dr. Elaine Young for nausea and postprandial epigastric burning. A copy of this note will be sent to the referring physician.  She just returned from [place] and had a bacterial illness. She is still having mushy stools. She has not had further nausea. She still has however postprandial epigastric burning.  Appetite has been variable.  She has been taking probitoics. She has been using Sucralfate at night.  She presented to [facility] ED last week due to abdominal pain and diarrhea. Her grandsone, Garth, also had similar symptoms. Denied any chest pain, shortness breath, fevers, chills, or diaphoresis.  We reviewed the lab results done in the ER in detail.  She has been on Pantoprazole QAM. She has been having epigastric burning mainly at night. She tried increasing it to BID but ozzyll has daily epigastric discomfort. Famotidine caused bloating; she tried Cimetidine OTC in the past with good results.   She was diagnosed with gastroparesis. Her weight has remained stable.  In 2024, she was found to be severely anemic. Vane Torre performed an EGD which revealed 3 large clean-based gastric ulcers.  She normally suffers from constipation (she can easily go about ~1 week without having a BM). Metamucil/Miralax did not help. Micheal Obrien started her on Amitiza 8mcg QD (BID dosing caused her to have diarrhea). I recommended she try Motegrity 1mg daily, which she states "is extremely efficient." At times she has had fecal incontinence from this. Hence she is not taking it every day just on days she knows she will not be leaving the house. She has also been using Mg x 2 at night. This will also trigger diarrhea.  She was diagnosed with H pylori infection in the summer of 2015. She was treated with an antibiotic regimen for 2 weeks. Eradication has since been confirmed.   She has lactose intolerance. She will use Lactaid as needed. Even when she drinks lactose free milk, she gets very gassy.  She is on Sotalol and Xarelto.  She has a history of AYAAN and seropositive RA. She failed Humira & Xeljanz. Rinvoq ( Upadacitinib ) was stopped after she developed PUD in 2024.  She is now on Methotrexate once a week and Prednisone 2.5 mg daily,.  She is Garth Zuniga's .  Summary of prior workup: - Labs on 6/25/2025: Sodium 138, potassium 4.1, BUN 9, creatinine 0.6, glucose 94, alkaline phosphatase 62, AST 19, ALT 14.  Calcium 9.4, magnesium 1.6, total protein 6.7, PMN 3.8, total bilirubin 0.7, high-sensitivity troponin less than 3.  Lipase 19, WBC 8.2.  Hemoglobin 12.5, MCV 99, platelets 389. - EGD by Vane Torre on 10/24: 3 clean based Mary. Normal duodenum.  - Labs on 9/11/2023: WBC 6.5, hemoglobin 11.4, MCV 97, platelets 423. - E/C by LG on 2/5/21 normal esophagus, medium size hiatal hernia.  2 nonbleeding cratered gastric ulcers in the antrum.  Diffuse moderate edema, erosions and erythema throughout the antrum.  Normal duodenum.  Biopsies were negative for H. pylori or celiac sprue. On colonoscopy, there was descending and sigmoid diverticulosis as well as nonbleeding external hemorrhoids.  The quality of the prep was good.   - CT scan w Dr Busby: umbilicla hernia.   - GES 12/21: severe gastroparesis. - EGD and colonoscopy by Dr. Michael Hickman in 2015. As per the patient, these were both normal
by RN Marycruz/yes

## 2025-08-07 ENCOUNTER — OFFICE VISIT (OUTPATIENT)
Dept: URBAN - METROPOLITAN AREA CLINIC 78 | Facility: CLINIC | Age: 80
End: 2025-08-07